# Patient Record
Sex: FEMALE | Race: WHITE | NOT HISPANIC OR LATINO | Employment: PART TIME | URBAN - METROPOLITAN AREA
[De-identification: names, ages, dates, MRNs, and addresses within clinical notes are randomized per-mention and may not be internally consistent; named-entity substitution may affect disease eponyms.]

---

## 2017-01-06 ENCOUNTER — TELEPHONE (OUTPATIENT)
Dept: OBSTETRICS AND GYNECOLOGY | Facility: CLINIC | Age: 22
End: 2017-01-06

## 2017-01-06 ENCOUNTER — HOSPITAL ENCOUNTER (INPATIENT)
Facility: OTHER | Age: 22
LOS: 4 days | Discharge: HOME | End: 2017-01-10
Attending: OBSTETRICS & GYNECOLOGY | Admitting: OBSTETRICS & GYNECOLOGY
Payer: COMMERCIAL

## 2017-01-06 DIAGNOSIS — O42.92 FULL-TERM PREMATURE RUPTURE OF MEMBRANES, UNSPECIFIED DURATION TO ONSET OF LABOR: Primary | ICD-10-CM

## 2017-01-06 DIAGNOSIS — O42.90 PROM (PREMATURE RUPTURE OF MEMBRANES): ICD-10-CM

## 2017-01-06 LAB
ABO + RH BLD: NORMAL
BASOPHILS # BLD AUTO: 0.01 K/UL
BASOPHILS NFR BLD: 0.1 %
BLD GP AB SCN CELLS X3 SERPL QL: NORMAL
DIFFERENTIAL METHOD: ABNORMAL
EOSINOPHIL # BLD AUTO: 0.1 K/UL
EOSINOPHIL NFR BLD: 0.7 %
ERYTHROCYTE [DISTWIDTH] IN BLOOD BY AUTOMATED COUNT: 13.2 %
HCT VFR BLD AUTO: 38.9 %
HGB BLD-MCNC: 13.1 G/DL
LYMPHOCYTES # BLD AUTO: 1.8 K/UL
LYMPHOCYTES NFR BLD: 17.2 %
MCH RBC QN AUTO: 30 PG
MCHC RBC AUTO-ENTMCNC: 33.7 %
MCV RBC AUTO: 89 FL
MONOCYTES # BLD AUTO: 0.9 K/UL
MONOCYTES NFR BLD: 8.2 %
NEUTROPHILS # BLD AUTO: 7.7 K/UL
NEUTROPHILS NFR BLD: 73.4 %
PLATELET # BLD AUTO: 214 K/UL
PMV BLD AUTO: 11.3 FL
RBC # BLD AUTO: 4.37 M/UL
WBC # BLD AUTO: 10.48 K/UL

## 2017-01-06 PROCEDURE — 63600175 PHARM REV CODE 636 W HCPCS: Performed by: ADVANCED PRACTICE MIDWIFE

## 2017-01-06 PROCEDURE — 3E0P7GC INTRODUCTION OF OTHER THERAPEUTIC SUBSTANCE INTO FEMALE REPRODUCTIVE, VIA NATURAL OR ARTIFICIAL OPENING: ICD-10-PCS | Performed by: OBSTETRICS & GYNECOLOGY

## 2017-01-06 PROCEDURE — 36415 COLL VENOUS BLD VENIPUNCTURE: CPT

## 2017-01-06 PROCEDURE — 11000001 HC ACUTE MED/SURG PRIVATE ROOM

## 2017-01-06 PROCEDURE — 99284 EMERGENCY DEPT VISIT MOD MDM: CPT

## 2017-01-06 PROCEDURE — 86900 BLOOD TYPING SEROLOGIC ABO: CPT

## 2017-01-06 PROCEDURE — 25000003 PHARM REV CODE 250: Performed by: ADVANCED PRACTICE MIDWIFE

## 2017-01-06 PROCEDURE — 86850 RBC ANTIBODY SCREEN: CPT

## 2017-01-06 PROCEDURE — 85025 COMPLETE CBC W/AUTO DIFF WBC: CPT

## 2017-01-06 RX ORDER — ONDANSETRON 8 MG/1
8 TABLET, ORALLY DISINTEGRATING ORAL EVERY 8 HOURS PRN
Status: DISCONTINUED | OUTPATIENT
Start: 2017-01-06 | End: 2017-01-08

## 2017-01-06 RX ORDER — ZOLPIDEM TARTRATE 5 MG/1
5 TABLET ORAL NIGHTLY
Status: DISCONTINUED | OUTPATIENT
Start: 2017-01-06 | End: 2017-01-10 | Stop reason: HOSPADM

## 2017-01-06 RX ORDER — MISOPROSTOL 200 UG/1
600 TABLET ORAL
Status: DISCONTINUED | OUTPATIENT
Start: 2017-01-06 | End: 2017-01-08

## 2017-01-06 RX ORDER — OXYTOCIN/RINGER'S LACTATE 20/1000 ML
2 PLASTIC BAG, INJECTION (ML) INTRAVENOUS CONTINUOUS
Status: DISCONTINUED | OUTPATIENT
Start: 2017-01-06 | End: 2017-01-08

## 2017-01-06 RX ORDER — SODIUM CHLORIDE, SODIUM LACTATE, POTASSIUM CHLORIDE, CALCIUM CHLORIDE 600; 310; 30; 20 MG/100ML; MG/100ML; MG/100ML; MG/100ML
INJECTION, SOLUTION INTRAVENOUS CONTINUOUS
Status: DISCONTINUED | OUTPATIENT
Start: 2017-01-06 | End: 2017-01-08

## 2017-01-06 RX ORDER — DIPHENHYDRAMINE HCL 25 MG
50 CAPSULE ORAL ONCE
Status: DISCONTINUED | OUTPATIENT
Start: 2017-01-06 | End: 2017-01-10 | Stop reason: HOSPADM

## 2017-01-06 RX ADMIN — SODIUM CHLORIDE, SODIUM LACTATE, POTASSIUM CHLORIDE, AND CALCIUM CHLORIDE: .6; .31; .03; .02 INJECTION, SOLUTION INTRAVENOUS at 09:01

## 2017-01-06 RX ADMIN — DINOPROSTONE 10 MG: 10 INSERT VAGINAL at 10:01

## 2017-01-06 RX ADMIN — ZOLPIDEM TARTRATE 5 MG: 5 TABLET, FILM COATED ORAL at 10:01

## 2017-01-06 RX ADMIN — DEXTROSE 5 MILLION UNITS: 50 INJECTION, SOLUTION INTRAVENOUS at 09:01

## 2017-01-06 NOTE — IP AVS SNAPSHOT
[facility] Location ([facility])  [address] [place], [place] [id number]  Phone: [phone]           Patient Discharge Instructions     Our goal is to set you up for success. This packet includes information on your condition, medications, and your home care. It will help you to care for yourself so you don't get sicker and need to go back to the hospital.     Please ask your nurse if you have any questions.        There are many details to remember when preparing to leave the hospital. Here is what you will need to do:    1. Take your medicine. If you are prescribed medications, review your Medication List in the following pages. You may have new medications to  at the pharmacy and others that you'll need to stop taking. Review the instructions for how and when to take your medications. Talk with your doctor or nurses if you are unsure of what to do.     2. Go to your follow-up appointments. Specific follow-up information is listed in the following pages. Your may be contacted by a transition nurse or clinical provider about future appointments. Be sure we have all of the phone numbers to reach you, if needed. Please contact your provider's office if you are unable to make an appointment.     3. Watch for warning signs. Your doctor or nurse will give you detailed warning signs to watch for and when to call for assistance. These instructions may also include educational information about your condition. If you experience any of warning signs to your health, call your doctor.               [facility]  Unless otherwise directed by your provider, please contact [facility], our nurse care line that is available for 24/7 assistance.     [phone] (toll-free)    Registered nurses in the [facility] provide clinical advisement, health education, appointment booking, and other advisory services.                    ** Verify the list of medication(s) below is accurate and up to  date. Carry this with you in case of emergency. If your medications have changed, please notify your healthcare provider.             Medication List      START taking these medications        Additional Info                      ibuprofen 600 MG tablet   Commonly known as:  ADVIL,MOTRIN   Quantity:  30 tablet   Refills:  2   Dose:  600 mg    Last time this was given:  600 mg on 1/10/2017  9:01 AM   Instructions:  Take 1 tablet (600 mg total) by mouth every 6 (six) hours.     Begin Date    AM    Noon    PM    Bedtime       oxycodone-acetaminophen 5-325 mg per tablet   Commonly known as:  PERCOCET   Quantity:  20 tablet   Refills:  0   Dose:  1 tablet    Last time this was given:  1 tablet on 1/9/2017 10:53 PM   Instructions:  Take 1 tablet by mouth every 4 (four) hours as needed.     Begin Date    AM    Noon    PM    Bedtime         CONTINUE taking these medications        Additional Info                      PRENATAL COMPLETE ORAL   Refills:  0    Instructions:  Take by mouth.     Begin Date    AM    Noon    PM    Bedtime       terconazole 0.4 % Crea   Commonly known as:  TERAZOL 7   Quantity:  45 g   Refills:  1   Dose:  1 applicator    Instructions:  Place 1 applicator vaginally once daily.     Begin Date    AM    Noon    PM    Bedtime            Where to Get Your Medications      These medications were sent to [facility]  [place], [place] - [address] [place] [address]     Phone:  [phone]     ibuprofen 600 MG tablet         You can get these medications from any pharmacy     Bring a paper prescription for each of these medications     oxycodone-acetaminophen 5-325 mg per tablet                  Please bring to all follow up appointments:    1. A copy of your discharge instructions.  2. All medicines you are currently taking in their original bottles.  3. Identification and insurance card.    Please arrive 15 minutes ahead of scheduled appointment time.    Please call  24 hours in advance if you must reschedule your appointment and/or time.        Follow-up Information     Follow up with Midwives In 6 weeks.    Why:  Post-partum check-up        Discharge Instructions     Future Orders    Activity as tolerated     Call MD for:  difficulty breathing or increased cough     Call MD for:  increased confusion or weakness     Call MD for:  persistent dizziness, light-headedness, or visual disturbances     Call MD for:  persistent nausea and vomiting or diarrhea     Call MD for:  severe persistent headache     Call MD for:  severe uncontrolled pain     Call MD for:  temperature >100.4     Call MD for:  worsening rash     Diet general     Questions:    Total calories:      Fat restriction, if any:      Protein restriction, if any:      Na restriction, if any:      Fluid restriction:      Additional restrictions:          Discharge Instructions       Breastfeeding Discharge Instructions       Feed the baby at the earliest sign of hunger or comfort  o Hands to mouth, sucking motions  o Rooting or searching for something to suck on  o Dont wait for crying - it is a late sign of hunger and comfort.     The feedings may be 8-12 times per 24hrs and will not follow a schedule   Avoid pacifiers and bottles for the first 4 weeks   Alternate the breast you start the feeding with, or start with the breast that feels the fullest   Switch breasts when the baby takes himself off the breast or falls asleep   Keep offering breasts until the baby looks full, no longer gives hunger signs, and stays asleep when placed on his back in the crib   If the baby is sleepy and wont wake for a feeding, put the baby skin-to-skin dressed in a diaper against the mothers bare chest   Sleep near your baby   The baby should be positioned and latched on to the breast correctly  o Chest-to-chest, chin in the breast  o Babys lips are flipped outward  o Babys mouth is stretched open wide like a  shout  o Babys sucking should feel like tugging to the mother  - The baby should be drinking at the breast:  o You should hear swallowing or gulping throughout the feeding  o You should see milk on the babys lips when he comes off the breast  o Your breasts should be softer when the baby is finished feeding  o The baby should look relaxed at the end of feedings  o After the 4th day and your milk is in:  o The babys poop should turn bright yellow and be loose, watery, and seedy  o The baby should have at least 3-4 poops the size of the palm of your hand per day  o The baby should have at least 6-8 wet diapers per day  o The urine should be light yellow in color  You should drink when you are thirsty and eat a healthy diet when you are    hungry.     Take naps to get the rest you need.   Take medications and/or drink alcohol only with permission of your obstetrician    or the babys pediatrician.  You can also call the [facility]ant Risk Center,   ([phone]), Monday-Friday, 8am-5pm Central time, to get the most   up-to-date evidence-based information on the use of medications during   pregnancy and breastfeeding.      The baby should be examined by a pediatrician at 3-5 days of age.  Once your   milk comes in, the baby should be gaining at least ½ - 1oz each day and should be back to birthweight no later than 10-14 days of age.          Community Resources    [facility] Breastfeeding Warmline:  [phone]  Local [facility] clinics: provide incentives and breastpumps to eligible mothers  [employer] (LLLI):  mother-to-mother support group website        www.lll.org  Local La [employer]eche League mother-to-mother support groups:        www.llalonsoPayActivfferson.com        La [employer]eche Levazquez of [place]         www.aranza@Metaboli.com  Dr. Fernando Somers website for latch videos and general information:        www.breastfeedinginc.ca  [facility] Risk [facility] is a call center that provides information about the  "safety of taking medications while breastfeeding.  Call [phone], M-F, 8am-5pm, CT.  International Lactation Consultant Association provides resources for assistance:        www.ilca.org  [place] [employer]feeding [employer] provides informationand resources for parents  and the community    http://louisianabreastfeeding.org     Bety mom provides resources for assistance:        www.nolamom.org  Part[employer] for Healthy Babies:  9-463-621-BABY(9896)  [employer] provides a list of breastfeeding services by zip code:        www.Affinity.Zeuss  Cafe au Lait:  220.738.8601 a breastfeeding support group for women of color        Primary Diagnosis     Your primary diagnosis was:  Status Post       Admission Information     Date & Time Provider Department CSN    2017  7:48 PM Abisai Dunbar Jr., MD [facility] [id number]      Care Providers     Provider Role Specialty Primary office phone    Abisai Dunbar Jr., MD Attending Provider Obstetrics [phone]    Maribeth Gutierrez MD Consulting Physician  Obstetrics and Gynecology [phone]    Shaye Tracy MD Surgeon  Obstetrics [phone]      Your Vitals Were     BP Pulse Temp Resp Height Weight    132/73 (Patient Position: Lying, BP Method: Automatic) 98 98.1 °F (36.7 °C) (Oral) 18 5' 4" (1.626 m) 85.3 kg (188 lb)    Last Period SpO2 BMI          2016 98% 32.27 kg/m2        Recent Lab Values     No lab values to display.      Allergies as of 1/10/2017     No Known Allergies      Advance Directives     An advance directive is a document which, in the event you are no longer able to make decisions for yourself, tells your healthcare team what kind of treatment you do or do not want to receive, or who you would like to make those decisions for you.  If you do not currently have an advance directive, [facility] encourages you to create one.  For more information call:  [phone]), [phone] ([phone]),  or log " on to [email or web address].        Language Assistance Services     ATTENTION: Language assistance services are available, free of charge. Please call [phone].      ATENCIÓN: Si habla mikey, tiene a castelan disposición servicios gratuitos de asistencia lingüística. Llame al [phone].     CHÚ Ý: N?u b?n nói Ti?ng Vi?t, có các d?ch v? h? tr? ngôn ng? mi?n phí dành cho b?n. G?i s? [phone].         [facility] complies with applicable Federal civil rights laws and does not discriminate on the basis of race, color, national origin, age, disability, or sex.

## 2017-01-06 NOTE — IP AVS SNAPSHOT
[facility] Location ([facility]) [address] [facility] [address]  Phone: [phone]           I have received a copy of my After Visit Summary and discharge instructions from [facility].    INSTRUCTIONS RECEIVED AND UNDERSTOOD BY:                     Patient/Patient Representative: ________________________________________________________________     Date/Time: ________________________________________________________________                     Instructions Given By: ________________________________________________________________     Date/Time: ________________________________________________________________

## 2017-01-06 NOTE — TELEPHONE ENCOUNTER
----- Message from Matty Givens sent at 1/6/2017  8:34 AM CST -----  Contact: Pt  Pt states that her water has broke. Please call and advise. Also I sent you a text with her information @08:32

## 2017-01-07 ENCOUNTER — ANESTHESIA (OUTPATIENT)
Dept: OBSTETRICS AND GYNECOLOGY | Facility: OTHER | Age: 22
End: 2017-01-07
Payer: COMMERCIAL

## 2017-01-07 ENCOUNTER — ANESTHESIA EVENT (OUTPATIENT)
Dept: OBSTETRICS AND GYNECOLOGY | Facility: OTHER | Age: 22
End: 2017-01-07
Payer: COMMERCIAL

## 2017-01-07 PROCEDURE — 27800517 HC TRAY,EPIDURAL-CONTINUOUS: Performed by: ANESTHESIOLOGY

## 2017-01-07 PROCEDURE — 63600175 PHARM REV CODE 636 W HCPCS: Performed by: ADVANCED PRACTICE MIDWIFE

## 2017-01-07 PROCEDURE — 25000003 PHARM REV CODE 250: Performed by: ADVANCED PRACTICE MIDWIFE

## 2017-01-07 PROCEDURE — 59510 CESAREAN DELIVERY: CPT | Mod: GB,,, | Performed by: OBSTETRICS & GYNECOLOGY

## 2017-01-07 PROCEDURE — 63600175 PHARM REV CODE 636 W HCPCS

## 2017-01-07 PROCEDURE — 3E033VJ INTRODUCTION OF OTHER HORMONE INTO PERIPHERAL VEIN, PERCUTANEOUS APPROACH: ICD-10-PCS | Performed by: OBSTETRICS & GYNECOLOGY

## 2017-01-07 PROCEDURE — 63600175 PHARM REV CODE 636 W HCPCS: Performed by: OBSTETRICS & GYNECOLOGY

## 2017-01-07 PROCEDURE — 11000001 HC ACUTE MED/SURG PRIVATE ROOM

## 2017-01-07 PROCEDURE — 25000003 PHARM REV CODE 250: Performed by: ANESTHESIOLOGY

## 2017-01-07 PROCEDURE — 37000008 HC ANESTHESIA 1ST 15 MINUTES: Performed by: OBSTETRICS & GYNECOLOGY

## 2017-01-07 PROCEDURE — 37000009 HC ANESTHESIA EA ADD 15 MINS: Performed by: OBSTETRICS & GYNECOLOGY

## 2017-01-07 PROCEDURE — 27200710 HC EPIDURAL INFUSION PUMP SET: Performed by: ANESTHESIOLOGY

## 2017-01-07 PROCEDURE — 51702 INSERT TEMP BLADDER CATH: CPT

## 2017-01-07 PROCEDURE — 59514 CESAREAN DELIVERY ONLY: CPT | Mod: ,,, | Performed by: ANESTHESIOLOGY

## 2017-01-07 PROCEDURE — 62326 NJX INTERLAMINAR LMBR/SAC: CPT | Performed by: ANESTHESIOLOGY

## 2017-01-07 PROCEDURE — 72100002 HC LABOR CARE, 1ST 8 HOURS

## 2017-01-07 PROCEDURE — 63600175 PHARM REV CODE 636 W HCPCS: Performed by: ANESTHESIOLOGY

## 2017-01-07 RX ORDER — FENTANYL/BUPIVACAINE/NS/PF 2MCG/ML-.1
PLASTIC BAG, INJECTION (ML) INJECTION CONTINUOUS
Status: DISCONTINUED | OUTPATIENT
Start: 2017-01-07 | End: 2017-01-08

## 2017-01-07 RX ORDER — FENTANYL CITRATE 50 UG/ML
INJECTION, SOLUTION INTRAMUSCULAR; INTRAVENOUS
Status: COMPLETED
Start: 2017-01-07 | End: 2017-01-07

## 2017-01-07 RX ORDER — CARBOPROST TROMETHAMINE 250 UG/ML
INJECTION, SOLUTION INTRAMUSCULAR
Status: DISCONTINUED
Start: 2017-01-07 | End: 2017-01-08 | Stop reason: WASHOUT

## 2017-01-07 RX ORDER — FENTANYL/BUPIVACAINE/NS/PF 2MCG/ML-.1
PLASTIC BAG, INJECTION (ML) INJECTION CONTINUOUS PRN
Status: DISCONTINUED | OUTPATIENT
Start: 2017-01-07 | End: 2017-01-08

## 2017-01-07 RX ORDER — SODIUM CITRATE AND CITRIC ACID MONOHYDRATE 334; 500 MG/5ML; MG/5ML
30 SOLUTION ORAL ONCE
Status: COMPLETED | OUTPATIENT
Start: 2017-01-07 | End: 2017-01-07

## 2017-01-07 RX ORDER — ONDANSETRON 2 MG/ML
4 INJECTION INTRAMUSCULAR; INTRAVENOUS EVERY 6 HOURS PRN
Status: DISCONTINUED | OUTPATIENT
Start: 2017-01-07 | End: 2017-01-08

## 2017-01-07 RX ORDER — METOCLOPRAMIDE HYDROCHLORIDE 5 MG/ML
10 INJECTION INTRAMUSCULAR; INTRAVENOUS ONCE
Status: COMPLETED | OUTPATIENT
Start: 2017-01-07 | End: 2017-01-07

## 2017-01-07 RX ORDER — FENTANYL/BUPIVACAINE/NS/PF 2MCG/ML-.1
PLASTIC BAG, INJECTION (ML) INJECTION
Status: DISPENSED
Start: 2017-01-07 | End: 2017-01-07

## 2017-01-07 RX ORDER — CEFAZOLIN SODIUM 2 G/50ML
2 SOLUTION INTRAVENOUS ONCE
Status: COMPLETED | OUTPATIENT
Start: 2017-01-07 | End: 2017-01-07

## 2017-01-07 RX ORDER — MISOPROSTOL 200 UG/1
TABLET ORAL
Status: COMPLETED
Start: 2017-01-07 | End: 2017-01-09

## 2017-01-07 RX ORDER — FAMOTIDINE 10 MG/ML
20 INJECTION INTRAVENOUS ONCE
Status: COMPLETED | OUTPATIENT
Start: 2017-01-07 | End: 2017-01-07

## 2017-01-07 RX ORDER — LIDOCAINE HYDROCHLORIDE AND EPINEPHRINE 15; 5 MG/ML; UG/ML
INJECTION, SOLUTION EPIDURAL
Status: DISCONTINUED | OUTPATIENT
Start: 2017-01-07 | End: 2017-01-08

## 2017-01-07 RX ORDER — BUPIVACAINE HYDROCHLORIDE 2.5 MG/ML
INJECTION, SOLUTION EPIDURAL; INFILTRATION; INTRACAUDAL
Status: DISPENSED
Start: 2017-01-07 | End: 2017-01-08

## 2017-01-07 RX ORDER — METHYLERGONOVINE MALEATE 0.2 MG/ML
INJECTION INTRAVENOUS
Status: COMPLETED
Start: 2017-01-07 | End: 2017-01-08

## 2017-01-07 RX ORDER — BUPIVACAINE HYDROCHLORIDE 2.5 MG/ML
INJECTION, SOLUTION EPIDURAL; INFILTRATION; INTRACAUDAL
Status: DISPENSED
Start: 2017-01-07 | End: 2017-01-07

## 2017-01-07 RX ADMIN — ONDANSETRON 8 MG: 8 TABLET, ORALLY DISINTEGRATING ORAL at 07:01

## 2017-01-07 RX ADMIN — DEXTROSE 2.5 MILLION UNITS: 50 INJECTION, SOLUTION INTRAVENOUS at 02:01

## 2017-01-07 RX ADMIN — DEXTROSE 2.5 MILLION UNITS: 50 INJECTION, SOLUTION INTRAVENOUS at 01:01

## 2017-01-07 RX ADMIN — PHENYLEPHRINE HYDROCHLORIDE 200 MCG: 10 INJECTION INTRAVENOUS at 11:01

## 2017-01-07 RX ADMIN — PROMETHAZINE HYDROCHLORIDE 12.5 MG: 25 INJECTION INTRAMUSCULAR; INTRAVENOUS at 05:01

## 2017-01-07 RX ADMIN — FENTANYL CITRATE 100 MCG: 50 INJECTION, SOLUTION INTRAMUSCULAR; INTRAVENOUS at 11:01

## 2017-01-07 RX ADMIN — DEXTROSE 2.5 MILLION UNITS: 50 INJECTION, SOLUTION INTRAVENOUS at 10:01

## 2017-01-07 RX ADMIN — OXYTOCIN 6 UNITS: 10 INJECTION, SOLUTION INTRAMUSCULAR; INTRAVENOUS at 11:01

## 2017-01-07 RX ADMIN — PHENYLEPHRINE HYDROCHLORIDE 100 MCG: 10 INJECTION INTRAVENOUS at 11:01

## 2017-01-07 RX ADMIN — METOCLOPRAMIDE 10 MG: 5 INJECTION, SOLUTION INTRAMUSCULAR; INTRAVENOUS at 11:01

## 2017-01-07 RX ADMIN — DEXTROSE 2.5 MILLION UNITS: 50 INJECTION, SOLUTION INTRAVENOUS at 06:01

## 2017-01-07 RX ADMIN — Medication 5 ML: at 11:01

## 2017-01-07 RX ADMIN — ONDANSETRON HYDROCHLORIDE 4 MG: 2 SOLUTION INTRAMUSCULAR; INTRAVENOUS at 03:01

## 2017-01-07 RX ADMIN — LIDOCAINE HYDROCHLORIDE AND EPINEPHRINE 3 ML: 15; 5 INJECTION, SOLUTION EPIDURAL at 11:01

## 2017-01-07 RX ADMIN — CEFAZOLIN SODIUM 2 G: 2 SOLUTION INTRAVENOUS at 11:01

## 2017-01-07 RX ADMIN — FAMOTIDINE 20 MG: 10 INJECTION, SOLUTION INTRAVENOUS at 11:01

## 2017-01-07 RX ADMIN — LIDOCAINE HYDROCHLORIDE,EPINEPHRINE BITARTRATE 10 ML: 20; .005 INJECTION, SOLUTION EPIDURAL; INFILTRATION; INTRACAUDAL; PERINEURAL at 11:01

## 2017-01-07 RX ADMIN — Medication 10 ML/HR: at 11:01

## 2017-01-07 RX ADMIN — DEXTROSE 2.5 MILLION UNITS: 50 INJECTION, SOLUTION INTRAVENOUS at 05:01

## 2017-01-07 RX ADMIN — ONDANSETRON HYDROCHLORIDE 4 MG: 2 SOLUTION INTRAMUSCULAR; INTRAVENOUS at 11:01

## 2017-01-07 RX ADMIN — SODIUM CITRATE AND CITRIC ACID MONOHYDRATE 30 ML: 500; 334 SOLUTION ORAL at 11:01

## 2017-01-07 RX ADMIN — Medication 2 MILLI-UNITS/MIN: at 11:01

## 2017-01-07 NOTE — PROGRESS NOTES
C/O nausea  Zofran IV given  's Ioana 1  CTX. Q 3-4/50-60  VE 4/90/-2/clfl  Continue induction

## 2017-01-07 NOTE — PROGRESS NOTES
Very uncomfortable with ctx.  Sitting on ball at bedside with doulas and  at side  VSS   's mod annette   CTX Q 1-2/50/mod to palp  VE unable to check r/t pt discomfort  Cervidil removed without difficulty  Continue induction   Start pitocin per protocol after pt rest

## 2017-01-07 NOTE — PROGRESS NOTES
Doing well  C/O occas cramping  VSS  's Ioana 1  CtX irreg/mild to palp  VE 2/40/-4/cl fl  Cervidil inserted without difficulty into posterior fornix  ambien or mu prn HS  Reassess prn

## 2017-01-07 NOTE — H&P
[facility]  History & Physical  Obstetrics      SUBJECTIVE:     Chief Complaint/Reason for Admission: PROM      History of Present Illness:  Maria Del Rosario Vasquez is a 21 y.o.  female with an Estimated Date of Delivery: 16 admitted for labor management.  Her current obstetrical history is significant for GBS colonizer.  She reports contractions since #:30  very irregular and very mild. Fetal Movement: normal.    PTA Medications   Medication Sig    PNV CMB#21/IRON/FOLIC ACID (PRENATAL COMPLETE ORAL) Take by mouth.    terconazole (TERAZOL 7) 0.4 % Crea Place 1 applicator vaginally once daily.       Review of patient's allergies indicates:  No Known Allergies     Past Medical History   Diagnosis Date    Anemia      past     Past Surgical History   Procedure Laterality Date    Tonsillectomy      [place] tooth extraction       Family History   Problem Relation Age of Onset    Diabetes Paternal Grandfather     Hyperlipidemia Paternal Grandfather     Heart disease Paternal Grandfather     Hypertension Paternal Grandfather     Emphysema Paternal Grandmother     Glaucoma Maternal Grandmother     Heart block Maternal Grandfather     No Known Problems Father     No Known Problems Mother     Colon cancer Neg Hx     Breast cancer Neg Hx     Ovarian cancer Neg Hx      Social History   Substance Use Topics    Smoking status: Never Smoker    Smokeless tobacco: Never Used    Alcohol use No      Comment: not with pregnancy       Review of Systems  Constitutional: no fever or chills     OBJECTIVE:     Vital Signs (Most Recent):  Temp: 98.1 °F (36.7 °C) (17)  Pulse: 101 (17)  BP: 131/78 (17)  SpO2: 96 % (17)    Physical Exam:  General:  alert and normal appearing gravid female   Skin:  Skin color, texture, turgor normal. No rashes or lesions   HEENT:  conjunctivae/corneas clear. PERRL.   Lungs:  clear to auscultation bilaterally   Heart:  regular  rate and rhythm, S1, S2 normal, no murmur, click, rub or gallop   Breasts:  no discharge, erythema, or tenderness   Abdomen:  normal findings: gravid   Uterine Size:  size equals dates   Presentations:  cephalic   FHT:  150's BPM   Pelvis: Vulva and vagina appear normal. Bimanual exam reveals normal uterus and adnexa.   Cervix:     Dilation: 2cm    Effacement: 40 %    Station:  Floating    Consistency: medium    Position: posterior     Laboratory:  Lab Results   Component Value Date    GROUPTRH O POS 2016    HEPBSAG Negative 2016        Diagnostic Results:  Labs: Reviewed  GBS POS    ASSESSMENT/PLAN:     40w6d gestation.  PROM   Conditions: GBBS Colonization     Risks, benefits, alternatives and possible complications have been discussed in detail with the patient.  Pre-admission, admission, and post admission procedures and expectations were discussed in detail.  All questions answered, all appropriate consents will be signed at the Hospital. Admission is planned for today.   Expectant management then induction if no progress.

## 2017-01-07 NOTE — ED TRIAGE NOTES
Placed in RM2. Mildred Villaseñor [facility] notified of arrival. Resting comfortably.

## 2017-01-07 NOTE — PROGRESS NOTES
Comfortable with epidural  VSS pitocin @ 2mu  's Ioana 1  Ctx. Q 3-5/40-50  VE no change 3/50/-3/cl fl/  Cook catheter placed without difficulty   Will continue pitocin induction

## 2017-01-07 NOTE — ANESTHESIA PREPROCEDURE EVALUATION
2017  Maria Del Rosario Vasquez is a 21 y.o., female. female with an Estimated Date of Delivery: 16 admitted for labor management. Her current obstetrical history is significant for GBS colonizer.     OB History      Para Term  AB TAB SAB Ectopic Multiple Living    2 0 0 0 1 0 1 0 0 0            Patient Active Problem List   Diagnosis    Prenatal care in third trimester    Pap smear, low-risk    GBS (group B Streptococcus carrier), +RV culture, currently pregnant    Elevated glucose    PROM (premature rupture of membranes)       Review of patient's allergies indicates:  No Known Allergies     No current facility-administered medications on file prior to encounter.      Current Outpatient Prescriptions on File Prior to Encounter   Medication Sig Dispense Refill    PNV CMB#21/IRON/FOLIC ACID (PRENATAL COMPLETE ORAL) Take by mouth.      terconazole (TERAZOL 7) 0.4 % Crea Place 1 applicator vaginally once daily. 45 g 1       Past Surgical History   Procedure Laterality Date    Tonsillectomy      [place] tooth extraction         Social History     Social History    Marital status:      Spouse name: N/A    Number of children: N/A    Years of education: N/A     Occupational History    Not on file.     Social History Main Topics    Smoking status: Never Smoker    Smokeless tobacco: Never Used    Alcohol use No      Comment: not with pregnancy    Drug use: No    Sexual activity: Yes     Partners: Male     Birth control/ protection: OCP     Other Topics Concern    Not on file     Social History Narrative     Derek    First baby for both         Vital Signs Range (Last 24H):  Temp:  [36.5 °C (97.7 °F)-37.4 °C (99.4 °F)]   Pulse:  []   Resp:  [18]   BP: (104-137)/(62-94)   SpO2:  [95 %-99 %]       CBC:   Recent Labs      17   2117   WBC  10.48   RBC  4.37    HGB  13.1   HCT  38.9   PLT  214   MCV  89   MCH  30.0   MCHC  33.7       CMP: No results for input(s): NA, K, CL, CO2, BUN, CREATININE, GLU, MG, PHOS, CALCIUM, ALBUMIN, PROT, ALKPHOS, ALT, AST, BILITOT in the last 72 hours.    INR  No results for input(s): INR, PROTIME, APTT in the last 72 hours.    Invalid input(s): PT            [facility] Anesthesia Evaluation    I have reviewed the Patient Summary Reports.    I have reviewed the Nursing Notes.   I have reviewed the Medications.     Review of Systems  Anesthesia Hx:  No problems with previous Anesthesia  History of prior surgery of interest to airway management or planning: Denies Family Hx of Anesthesia complications.   Denies Personal Hx of Anesthesia complications.   Social:  No Alcohol Use, Non-Smoker    Hematology/Oncology:  Hematology Normal   Oncology Normal     EENT/Dental:EENT/Dental Normal   Cardiovascular:  Cardiovascular Normal Exercise tolerance: good     Pulmonary:  Pulmonary Normal    Renal/:  Renal/ Normal     Hepatic/GI:  Hepatic/GI Normal    Musculoskeletal:  Musculoskeletal Normal    Neurological:  Neurology Normal    Endocrine:  Endocrine Normal    Dermatological:  Skin Normal    Psych:   anxiety          Physical Exam  General:  Well nourished    Airway/Jaw/Neck:  Airway Findings: Mouth Opening: Normal Tongue: Normal  General Airway Assessment: Adult  Mallampati: III  TM Distance: Normal, at least 6 cm  Jaw/Neck Findings:  Neck ROM: Normal ROM      Dental:  Dental Findings: In tact   Chest/Lungs:  Chest/Lungs Findings: Normal Respiratory Rate, Clear to auscultation     Heart/Vascular:  Heart Findings: Rate: Normal  Rhythm: Regular Rhythm  Sounds: Normal        Mental Status:  Mental Status Findings:  Cooperative, Alert and Oriented         Anesthesia Plan  Type of Anesthesia, risks & benefits discussed:  Anesthesia Type:  general, epidural, CSE, spinal  Patient's Preference:   Intra-op Monitoring Plan:   Intra-op Monitoring Plan Comments:    Post Op Pain Control Plan:   Post Op Pain Control Plan Comments:   Induction:   IV  Beta Blocker:  Patient is not currently on a Beta-Blocker (No further documentation required).       Informed Consent: Patient understands risks and agrees with Anesthesia plan.  Questions answered. Anesthesia consent signed with patient.  ASA Score: 2     Day of Surgery Review of History & Physical:  There are no significant changes.          Ready For Surgery From Anesthesia Perspective.

## 2017-01-07 NOTE — ANESTHESIA PROCEDURE NOTES
Epidural    Patient location during procedure: OB   Reason for block: primary anesthetic   Diagnosis: IUP   Start time: 1/7/2017 11:38 AM  Timeout: 1/7/2017 11:35 AM  End time: 1/7/2017 11:42 AM  Surgery related to: labor  Staffing  Anesthesiologist: KATELYN ALONSO  Resident/CRNA: TONY BOBO  Performed by: resident/CRNA   Preanesthetic Checklist  Completed: patient identified, site marked, surgical consent, pre-op evaluation, timeout performed, IV checked, risks and benefits discussed, monitors and equipment checked, anesthesia consent given, hand hygiene performed and patient being monitored  Preparation  Patient position: sitting  Prep: ChloraPrep  Patient monitoring: Pulse Ox and Blood Pressure  Epidural  Skin Anesthetic: lidocaine 1%  Skin Wheal: 3 mL  Administration type: continuous  Approach: midline  Interspace: L3-4  Injection technique: LISA saline  Needle and Epidural Catheter  Needle type: Tuohy   Needle gauge: 17  Needle length: 3.5 inches  Needle insertion depth: 6 cm  Catheter type: springwound  Catheter size: 19 G  Catheter at skin depth: 11 cm  Test dose: 3 mL of lidocaine 1.5% with Epi 1-to-200,000  Additional Documentation: incremental injection, no signs/symptoms of IV or SA injection, no paresthesia on injection, no significant pain on injection and no significant complaints from patient  Needle localization: anatomical landmarks  Medications:  Bolus administered: 10 mL of 0.125% bupivacaine  Opioid administered: 100 mcg of   fentanyl  Volume per aspiration: 5 mL  Time between aspirations: 5 minutes  Assessment  Ease of block: easy  Patient's tolerance of the procedure: comfortable throughout block and no complaints

## 2017-01-07 NOTE — PROGRESS NOTES
Called to pts room  Would like to have epidural inserted prior to pitocin induction  Anesthesia aware of request.

## 2017-01-08 PROBLEM — O42.92 FULL-TERM PREMATURE RUPTURE OF MEMBRANES: Status: ACTIVE | Noted: 2017-01-08

## 2017-01-08 LAB
ALLENS TEST: ABNORMAL
BASOPHILS # BLD AUTO: ABNORMAL K/UL
BASOPHILS NFR BLD: 0 %
DIFFERENTIAL METHOD: ABNORMAL
EOSINOPHIL # BLD AUTO: ABNORMAL K/UL
EOSINOPHIL NFR BLD: 0 %
ERYTHROCYTE [DISTWIDTH] IN BLOOD BY AUTOMATED COUNT: 13.4 %
HCO3 UR-SCNC: 21.1 MMOL/L (ref 24–28)
HCT VFR BLD AUTO: 33.1 %
HGB BLD-MCNC: 11 G/DL
LYMPHOCYTES # BLD AUTO: ABNORMAL K/UL
LYMPHOCYTES NFR BLD: 5 %
MCH RBC QN AUTO: 30.1 PG
MCHC RBC AUTO-ENTMCNC: 33.2 %
MCV RBC AUTO: 91 FL
MONOCYTES # BLD AUTO: ABNORMAL K/UL
MONOCYTES NFR BLD: 14 %
NEUTROPHILS NFR BLD: 69 %
NEUTS BAND NFR BLD MANUAL: 12 %
PCO2 BLDA: 49.3 MMHG (ref 35–45)
PH SMN: 7.24 [PH] (ref 7.35–7.45)
PLATELET # BLD AUTO: 185 K/UL
PLATELET BLD QL SMEAR: ABNORMAL
PMV BLD AUTO: 11.1 FL
PO2 BLDA: 22 MMHG (ref 80–100)
POC BE: -6 MMOL/L
POC SATURATED O2: 29 % (ref 95–100)
RBC # BLD AUTO: 3.65 M/UL
SAMPLE: ABNORMAL
SITE: ABNORMAL
WBC # BLD AUTO: 22.49 K/UL

## 2017-01-08 PROCEDURE — 36415 COLL VENOUS BLD VENIPUNCTURE: CPT

## 2017-01-08 PROCEDURE — 63600175 PHARM REV CODE 636 W HCPCS: Performed by: ANESTHESIOLOGY

## 2017-01-08 PROCEDURE — 63600175 PHARM REV CODE 636 W HCPCS

## 2017-01-08 PROCEDURE — 99900035 HC TECH TIME PER 15 MIN (STAT)

## 2017-01-08 PROCEDURE — 85007 BL SMEAR W/DIFF WBC COUNT: CPT

## 2017-01-08 PROCEDURE — 36000685 HC CESAREAN SECTION LEVEL I

## 2017-01-08 PROCEDURE — 82803 BLOOD GASES ANY COMBINATION: CPT

## 2017-01-08 PROCEDURE — 85027 COMPLETE CBC AUTOMATED: CPT

## 2017-01-08 PROCEDURE — 25000003 PHARM REV CODE 250: Performed by: ANESTHESIOLOGY

## 2017-01-08 PROCEDURE — 25000003 PHARM REV CODE 250: Performed by: ADVANCED PRACTICE MIDWIFE

## 2017-01-08 PROCEDURE — 25000003 PHARM REV CODE 250: Performed by: STUDENT IN AN ORGANIZED HEALTH CARE EDUCATION/TRAINING PROGRAM

## 2017-01-08 PROCEDURE — 11000001 HC ACUTE MED/SURG PRIVATE ROOM

## 2017-01-08 PROCEDURE — 72100003 HC LABOR CARE, EA. ADDL. 8 HRS

## 2017-01-08 RX ORDER — ONDANSETRON 2 MG/ML
4 INJECTION INTRAMUSCULAR; INTRAVENOUS EVERY 12 HOURS PRN
Status: DISCONTINUED | OUTPATIENT
Start: 2017-01-08 | End: 2017-01-10 | Stop reason: HOSPADM

## 2017-01-08 RX ORDER — IBUPROFEN 600 MG/1
600 TABLET ORAL EVERY 6 HOURS
Status: DISCONTINUED | OUTPATIENT
Start: 2017-01-08 | End: 2017-01-08

## 2017-01-08 RX ORDER — KETOROLAC TROMETHAMINE 30 MG/ML
30 INJECTION, SOLUTION INTRAMUSCULAR; INTRAVENOUS EVERY 6 HOURS
Status: COMPLETED | OUTPATIENT
Start: 2017-01-08 | End: 2017-01-08

## 2017-01-08 RX ORDER — LIDOCAINE HCL/EPINEPHRINE/PF 2%-1:200K
VIAL (ML) INJECTION
Status: DISCONTINUED | OUTPATIENT
Start: 2017-01-07 | End: 2017-01-08

## 2017-01-08 RX ORDER — SODIUM CHLORIDE, SODIUM LACTATE, POTASSIUM CHLORIDE, CALCIUM CHLORIDE 600; 310; 30; 20 MG/100ML; MG/100ML; MG/100ML; MG/100ML
INJECTION, SOLUTION INTRAVENOUS CONTINUOUS
Status: ACTIVE | OUTPATIENT
Start: 2017-01-08 | End: 2017-01-08

## 2017-01-08 RX ORDER — OXYCODONE AND ACETAMINOPHEN 5; 325 MG/1; MG/1
1 TABLET ORAL EVERY 4 HOURS PRN
Status: DISCONTINUED | OUTPATIENT
Start: 2017-01-08 | End: 2017-01-10 | Stop reason: HOSPADM

## 2017-01-08 RX ORDER — DIPHENHYDRAMINE HCL 25 MG
25 CAPSULE ORAL EVERY 4 HOURS PRN
Status: DISCONTINUED | OUTPATIENT
Start: 2017-01-08 | End: 2017-01-10 | Stop reason: HOSPADM

## 2017-01-08 RX ORDER — ADHESIVE BANDAGE
30 BANDAGE TOPICAL 2 TIMES DAILY PRN
Status: DISCONTINUED | OUTPATIENT
Start: 2017-01-09 | End: 2017-01-10 | Stop reason: HOSPADM

## 2017-01-08 RX ORDER — BISACODYL 10 MG
10 SUPPOSITORY, RECTAL RECTAL ONCE AS NEEDED
Status: ACTIVE | OUTPATIENT
Start: 2017-01-08 | End: 2017-01-08

## 2017-01-08 RX ORDER — HYDROCORTISONE 25 MG/G
CREAM TOPICAL 3 TIMES DAILY PRN
Status: DISCONTINUED | OUTPATIENT
Start: 2017-01-08 | End: 2017-01-10 | Stop reason: HOSPADM

## 2017-01-08 RX ORDER — ACETAMINOPHEN 10 MG/ML
INJECTION, SOLUTION INTRAVENOUS
Status: DISCONTINUED | OUTPATIENT
Start: 2017-01-08 | End: 2017-01-08

## 2017-01-08 RX ORDER — DOCUSATE SODIUM 100 MG/1
200 CAPSULE, LIQUID FILLED ORAL 2 TIMES DAILY
Status: DISCONTINUED | OUTPATIENT
Start: 2017-01-08 | End: 2017-01-10 | Stop reason: HOSPADM

## 2017-01-08 RX ORDER — MISOPROSTOL 200 UG/1
200 TABLET ORAL EVERY 6 HOURS
Status: DISCONTINUED | OUTPATIENT
Start: 2017-01-08 | End: 2017-01-09

## 2017-01-08 RX ORDER — DIPHENHYDRAMINE HYDROCHLORIDE 50 MG/ML
25 INJECTION INTRAMUSCULAR; INTRAVENOUS EVERY 4 HOURS PRN
Status: DISCONTINUED | OUTPATIENT
Start: 2017-01-08 | End: 2017-01-10 | Stop reason: HOSPADM

## 2017-01-08 RX ORDER — OXYCODONE HYDROCHLORIDE 5 MG/1
5 TABLET ORAL EVERY 4 HOURS PRN
Status: DISPENSED | OUTPATIENT
Start: 2017-01-08 | End: 2017-01-09

## 2017-01-08 RX ORDER — OXYCODONE AND ACETAMINOPHEN 10; 325 MG/1; MG/1
1 TABLET ORAL EVERY 4 HOURS PRN
Status: DISCONTINUED | OUTPATIENT
Start: 2017-01-08 | End: 2017-01-10 | Stop reason: HOSPADM

## 2017-01-08 RX ORDER — OXYCODONE HYDROCHLORIDE 5 MG/1
10 TABLET ORAL EVERY 4 HOURS PRN
Status: ACTIVE | OUTPATIENT
Start: 2017-01-08 | End: 2017-01-09

## 2017-01-08 RX ORDER — ONDANSETRON 8 MG/1
8 TABLET, ORALLY DISINTEGRATING ORAL EVERY 8 HOURS PRN
Status: DISCONTINUED | OUTPATIENT
Start: 2017-01-08 | End: 2017-01-10 | Stop reason: HOSPADM

## 2017-01-08 RX ORDER — OXYTOCIN/RINGER'S LACTATE 20/1000 ML
41.65 PLASTIC BAG, INJECTION (ML) INTRAVENOUS CONTINUOUS
Status: ACTIVE | OUTPATIENT
Start: 2017-01-08 | End: 2017-01-08

## 2017-01-08 RX ORDER — ACETAMINOPHEN 325 MG/1
650 TABLET ORAL EVERY 6 HOURS
Status: COMPLETED | OUTPATIENT
Start: 2017-01-08 | End: 2017-01-09

## 2017-01-08 RX ORDER — PHENYLEPHRINE HYDROCHLORIDE 10 MG/ML
INJECTION INTRAVENOUS
Status: DISCONTINUED | OUTPATIENT
Start: 2017-01-07 | End: 2017-01-08

## 2017-01-08 RX ORDER — IBUPROFEN 600 MG/1
600 TABLET ORAL EVERY 6 HOURS
Status: DISCONTINUED | OUTPATIENT
Start: 2017-01-09 | End: 2017-01-10 | Stop reason: HOSPADM

## 2017-01-08 RX ORDER — OXYTOCIN 10 [USP'U]/ML
INJECTION, SOLUTION INTRAMUSCULAR; INTRAVENOUS
Status: DISCONTINUED | OUTPATIENT
Start: 2017-01-08 | End: 2017-01-08

## 2017-01-08 RX ORDER — AMOXICILLIN 250 MG
1 CAPSULE ORAL NIGHTLY PRN
Status: DISCONTINUED | OUTPATIENT
Start: 2017-01-08 | End: 2017-01-10 | Stop reason: HOSPADM

## 2017-01-08 RX ORDER — MORPHINE SULFATE 0.5 MG/ML
INJECTION, SOLUTION EPIDURAL; INTRATHECAL; INTRAVENOUS
Status: DISCONTINUED | OUTPATIENT
Start: 2017-01-08 | End: 2017-01-08

## 2017-01-08 RX ORDER — SIMETHICONE 80 MG
1 TABLET,CHEWABLE ORAL EVERY 6 HOURS PRN
Status: DISCONTINUED | OUTPATIENT
Start: 2017-01-08 | End: 2017-01-10 | Stop reason: HOSPADM

## 2017-01-08 RX ADMIN — OXYTOCIN 3 UNITS: 10 INJECTION, SOLUTION INTRAMUSCULAR; INTRAVENOUS at 12:01

## 2017-01-08 RX ADMIN — MISOPROSTOL 200 MCG: 200 TABLET ORAL at 05:01

## 2017-01-08 RX ADMIN — Medication 1.5 MG: at 12:01

## 2017-01-08 RX ADMIN — ZOLPIDEM TARTRATE 5 MG: 5 TABLET, FILM COATED ORAL at 09:01

## 2017-01-08 RX ADMIN — MISOPROSTOL 200 MCG: 200 TABLET ORAL at 06:01

## 2017-01-08 RX ADMIN — ACETAMINOPHEN 650 MG: 325 TABLET ORAL at 05:01

## 2017-01-08 RX ADMIN — DIPHENHYDRAMINE HYDROCHLORIDE 25 MG: 25 CAPSULE ORAL at 08:01

## 2017-01-08 RX ADMIN — KETOROLAC TROMETHAMINE 30 MG: 30 INJECTION, SOLUTION INTRAMUSCULAR at 12:01

## 2017-01-08 RX ADMIN — PHENYLEPHRINE HYDROCHLORIDE 100 MCG: 10 INJECTION INTRAVENOUS at 12:01

## 2017-01-08 RX ADMIN — OXYCODONE HYDROCHLORIDE 5 MG: 5 TABLET ORAL at 02:01

## 2017-01-08 RX ADMIN — DOCUSATE SODIUM 200 MG: 100 CAPSULE, LIQUID FILLED ORAL at 09:01

## 2017-01-08 RX ADMIN — SODIUM CHLORIDE, SODIUM LACTATE, POTASSIUM CHLORIDE, AND CALCIUM CHLORIDE: .6; .31; .03; .02 INJECTION, SOLUTION INTRAVENOUS at 06:01

## 2017-01-08 RX ADMIN — ACETAMINOPHEN 650 MG: 325 TABLET ORAL at 12:01

## 2017-01-08 RX ADMIN — KETOROLAC TROMETHAMINE 30 MG: 30 INJECTION, SOLUTION INTRAMUSCULAR at 05:01

## 2017-01-08 RX ADMIN — SODIUM CHLORIDE, SODIUM LACTATE, POTASSIUM CHLORIDE, AND CALCIUM CHLORIDE 2600 ML: .6; .31; .03; .02 INJECTION, SOLUTION INTRAVENOUS at 12:01

## 2017-01-08 RX ADMIN — MISOPROSTOL 200 MCG: 200 TABLET ORAL at 12:01

## 2017-01-08 RX ADMIN — OXYCODONE HYDROCHLORIDE 5 MG: 5 TABLET ORAL at 05:01

## 2017-01-08 RX ADMIN — METHYLERGONOVINE MALEATE 200 MCG: 0.2 INJECTION, SOLUTION INTRAMUSCULAR; INTRAVENOUS at 12:01

## 2017-01-08 RX ADMIN — ACETAMINOPHEN 650 MG: 325 TABLET ORAL at 06:01

## 2017-01-08 RX ADMIN — ACETAMINOPHEN 1000 MG: 10 INJECTION, SOLUTION INTRAVENOUS at 12:01

## 2017-01-08 RX ADMIN — DOCUSATE SODIUM 200 MG: 100 CAPSULE, LIQUID FILLED ORAL at 08:01

## 2017-01-08 RX ADMIN — KETOROLAC TROMETHAMINE 30 MG: 30 INJECTION, SOLUTION INTRAMUSCULAR at 06:01

## 2017-01-08 NOTE — ANESTHESIA POSTPROCEDURE EVALUATION
"Anesthesia Post Evaluation    Patient: Maria Del Rosario Vasquez    Procedure(s) Performed: Procedure(s) (LRB):  DELIVERY- SECTION (N/A)    Final Anesthesia Type: epidural  Patient location during evaluation: labor & delivery  Patient participation: Yes- Able to Participate  Level of consciousness: awake and alert and oriented  Post-procedure vital signs: reviewed and stable  Pain management: adequate  Airway patency: patent  PONV status at discharge: No PONV  Anesthetic complications: no      Cardiovascular status: blood pressure returned to baseline and hemodynamically stable  Respiratory status: unassisted, room air and spontaneous ventilation  Hydration status: euvolemic  Follow-up not needed.        Visit Vitals    /61    Pulse 88    Temp 36.7 °C (98 °F) (Oral)    Resp 18    Ht 5' 4" (1.626 m)    Wt 85.3 kg (188 lb)    LMP 2016    SpO2 97%    Breastfeeding No    BMI 32.27 kg/m2       Pain/Alejandrina Score: Pain Assessment Performed: Yes (2017  6:20 AM)  Presence of Pain: complains of pain/discomfort (2017  6:20 AM)  Pain Rating Prior to Med Admin: 4 (2017 12:00 PM)      "

## 2017-01-08 NOTE — PROGRESS NOTES
POSTPARTUM PROGRESS NOTE     Maria Del Rosario Vasquez is a 21 y.o. female POD #1 status post Primary  section at 41w1d in a pregnancy complicated by PROM and GBS. Patient was laboring with the midwives when arrest of dilation necessitated  delivery.    Patient is doing well this morning. She denies nausea, vomiting, fever or chills.  Patient reports moderate abdominal pain that is well relieved by oral pain medications. Lochia is mild to moderate  and stable. Patient is voiding with a diaz catheter and has not yet ambulated. She has passed flatus, and has not had BM.  Patient does plan to breast feed. Deferring contraception to the midwives. She declines circumcision.    Objective:       Temp:  [97.3 °F (36.3 °C)-99.7 °F (37.6 °C)] 99.7 °F (37.6 °C)  Pulse:  [] 108  Resp:  [18] 18  BP: (115-158)/() 134/89    General:   alert, appears stated age and cooperative   Lungs:   clear to auscultation bilaterally   Heart:   regular rate and rhythm, S1, S2 normal, no murmur, click, rub or gallop   Abdomen:  soft, non-tender; bowel sounds normal; no masses,  no organomegaly   Uterus:  firm located at the umblicus.    Incision: Bandage in place, clean, dry and intact; no erythema or induration   Extremities: peripheral pulses normal, no pedal edema, no clubbing or cyanosis     Lab Review  No results found for this or any previous visit (from the past 4 hour(s)).    I/O    Intake/Output Summary (Last 24 hours) at 17 0608  Last data filed at 17 0029   Gross per 24 hour   Intake          2954.17 ml   Output             1650 ml   Net          1304.17 ml        Assessment:     Patient Active Problem List   Diagnosis    Prenatal care in third trimester    Pap smear, low-risk    GBS (group B Streptococcus carrier), +RV culture, currently pregnant    Elevated glucose    PROM (premature rupture of membranes)    Full-term premature rupture of membranes     Plan:   1. Postpartum care:  - Patient doing  well. Continue routine management and advances.  - Continue PO pain meds. Pain well controlled.  - Heme: CBC on 1/6/17: 10.5/13.1/38.9/214  - postpartum H/h pending  - Encourage ambulation  - Circumcision declined  - Contraception deferred to MW  - Lactation consult PRN    Dispo: As patient meets milestones, will plan to discharge POD #2-4.    Kelin Crump MD, PhD  OBGYN, PGY-1

## 2017-01-08 NOTE — LACTATION NOTE
"   01/08/17 1100   Maternal Infant Assessment   Breast Density Bilateral:;soft   Nipple(s) Right:;everted   Infant Assessment   Sucking Reflex present   Rooting Reflex present   LATCH Score   Latch 1-->repeated attempts, holds nipple in mouth, stimulate to suck   Audible Swallowing 2-->spontaneous and intermittent (24 hrs old)   Type Of Nipple 2-->everted (after stimulation)   Comfort (Breast/Nipple) 2-->soft/nontender   Hold (Positioning) 1-->minimal assist, teach one side: mother does other, staff holds   Score (less than 7 for 2/more consecutive times, consult Lactation Consultant) 8       Number Scale   Presence of Pain denies   Location nipple(s)   Pain Rating: Rest 0   Pain Rating: Activity 0   Maternal Infant Feeding   Infant Positioning clutch/"football"   Time Spent (min) 15-30 min   Latch Assistance yes   Breastfeeding History   Currently Breastfeeding yes   Feeding Infant   Feeding Readiness Cues hand to mouth movements;rooting   Effective Latch During Feeding yes   Audible Swallow yes   Suck/Swallow Coordination present   Lactation Referrals   Lactation Consult Breastfeeding assessment;Initial assessment   Lactation Interventions   Attachment Promotion breastfeeding assistance provided;counseling provided   Breastfeeding Assistance infant latch-on verified;infant suck/swallow verified;assisted with positioning   Maternal Breastfeeding Support lactation counseling provided   Latch Promotion positioning assisted;infant moved to breast;suck stimulated with colostrum drop   Lactation note: assisted pt with positioning and latch. After several attempts baby was able to latch to breast and rhythmically suck. Breastfeeding education given. Questions answered.    "

## 2017-01-08 NOTE — PROGRESS NOTES
CNM Social Note  Doing well this am  Got a little rest  Baby latching well to breast  Pain well controlled with po rx  No complaints

## 2017-01-08 NOTE — TRANSFER OF CARE
"Anesthesia Transfer of Care Note    Patient: Maria Del Rosario Vasquez    Procedure(s) Performed: Procedure(s) (LRB):  DELIVERY- SECTION (N/A)    Patient location: Labor and Delivery    Anesthesia Type: epidural    Transport from OR: Transported from OR on room air with adequate spontaneous ventilation    Post pain: adequate analgesia    Post assessment: no apparent anesthetic complications    Post vital signs: stable    Level of consciousness: awake and alert    Nausea/Vomiting: no nausea/vomiting    Complications: none          Last vitals:   Visit Vitals    /89    Pulse 108    Temp 37.6 °C (99.7 °F) (Temporal)    Resp 18    Ht 5' 4" (1.626 m)    Wt 85.3 kg (188 lb)    LMP 2016    SpO2 100%    Breastfeeding No    BMI 32.27 kg/m2     "

## 2017-01-08 NOTE — PROGRESS NOTES
In reviewing orders before administering 0600 doses of Tylenol and Toradol, held orders found which would D/C these medications. Called anesthesia and spoke w/Dr. Grimm who states to refrain from releasing held orders for now (can release after 24 hours), OK to administer Tylenol and Toradol at this time and for the first 24 hours. Will administer medications and continue to monitor.

## 2017-01-08 NOTE — L&D DELIVERY NOTE
OPERATIVE NOTE      SECTION    DATE OF PROCEDURE: 2017    PREOPERATIVE DIAGNOSES:   1. Full-term premature rupture of membranes, unspecified duration to onset of labor    2. PROM (premature rupture of membranes)        POSTOPERATIVE DIAGNOSES:   1. Full-term premature rupture of membranes, unspecified duration to onset of labor    2. PROM (premature rupture of membranes)      SURGEON : GRICELDA Tracy MD     ASSISTANT: Kelin Crump MD, PhD    PROCEDURE: Primary low transverse  section via Pfannenstiel incision.     ANESTHESIA: Spinal anesthesia    ESTIMATED BLOOD LOSS: 1200 cc    UOP: 150 cc    FLUIDS: 1000 cc    INDICATIONS: Ms. Maria Del Rosario Vasquez is a 21 y.o.  female with IUP 41w1d weeks' gestational age.    FINDINGS: male infant in cephalic presentation. Infant weighing 3684g with Apgars of 6 and 9 at 1 and 5 minutes respectively. Normal appearing uterus, tubes and ovaries.     PROCEDURE IN DETAIL: The patient was taken back to the Operating Room where anesthesia was found to be adequate. The patient was prepped and draped in normal sterile fashion in dorsal supine position with leftward tilt.  Skin incision was made with scalpel and this incision was taken down to the underlying fascia with the knife. Incision was made in the midline of the fascia with inside knife; the incision was extended laterally using curved Cantrell scissors on both sides. Using Kocher clamps, the superior aspect of the fascia was grasped and tented up and the underlying rectus muscle was  off the fascia bluntly with the assistance of curved Cantrell scissors. In a similar fashion, the inferior aspect of the fascia was grasped with Kocher's, tented up and the underlying rectus muscle was  off bluntly with the assistance of curved Cantrell scissors. The muscle was then incised in midline and  bluntly. Peritoneum was identified, tented up with hemostats and entered sharply with Metzenbaum scissors  and then extended superiorly and inferiorly bluntly. Bladder blade was inserted and the lower uterine segment identified. A low transverse incision was made on the uterus with a scalpel. The amniotic sac was intact and entered bluntly. The infant was found to be in a cephalic presentation. The head was then delivered atraumatically and the infant shortly followed. The cord was clamped and cut and the infant was suctioned with bulb and handed off to the awaiting nurses. Cord gas and blood were collected and sent.   The placenta was then delivered manually. The uterus was then exteriorized and cleared of all debris and clots. The hysterotomy was then closed using #1 chromic in a running locked fashion. The uterus was found to be atonic and an additional 20U of pitocin were added to the bag running open for a total of 40U pitocin. Bleeding persisted and 0.2 mg methergine was administered to the left thigh IM. Tone improved and bleeding slowed thereafter. Good hemostasis was noted at both the angles and at the repaired hysterotomy incision. The posterior aspect of the uterus was irrigated and cleared of clots. The uterus was then replaced into the abdomen and the gutters were irrigated and cleared of all clots. The peritoneum was then reapproximated with 2-0 vicryl. The muscle was then reapproximated using #1 chromic. The fascia was then reapproximated using #1 PDS in a running fashion and tied at the midline. Good hemostasis was noted throughout. The skin was then closed using 4-0 Monocryl in a running fashion.   Sponge, lap, and needle counts were good x 2. The patient tolerated the procedure well and was transferred over to the recovery area in stable condition.     Delivery Information for  Seth Vasquez    Birth information:  YOB: 2018   Time of birth: 11:57 PM   Sex: male   Head Delivery Date/Time: 2017 11:56 PM   Delivery type: , Low Transverse   Gestational Age: 41w0d    Delivery  Providers    Delivering clinician:  KATHERINE CORTÉS   Other personnel:   Provider Role   DAPHNE TREJO                   [place] Measurements    Weight:  3684 g Length:  52.7 cm   Head circum.:  35.6 cm Chest circum.:  35.6 cm           Assessment    Living status:  Yes   Apgars    1 Minute:   5 Minute:   10 Minute 15 Minute 20 Minute   Skin Color: 0  1       Heart Rate: 2  2       Reflex Irritability: 1  2       Muscle Tone: 1  2       Respiratory Effort: 2  2       Total: 6  9                  Apgars Assigned By:  NICU          Assisted Delivery Details:    Forceps attempted?:  No   Vacuum extractor attempted?:  Yes   Vacuum type:  Kiwi   Vacuum application location:  Outlet   First attempt time vacuum applied:  2017 2356   Number of pop offs:  1   Number of pulls with vacuum:  1   Total vacuum application time:  4 seconds   Vacuum applied by:  MILANA   Failed vacuum delivery?:  No                   Presentation and Position    Presentation: Vertex   Position:                    Interventions/Resuscitation    Method:  NICU Attended        Cord    Complications:  Nuchal   Nuchal Intervention:  reduced   Nuchal Cord Description:  loose nuchal cord   Number of Loops:  1   Delayed Cord Clamping?:  No   Cord Clamped Date/Time:  2017 11:57 PM   Cord Blood Disposition:  Sent with Baby   Gases Sent?:  Yes   Stem Cell Collection (by MD):  No        Placenta    Date and time:  2017 11:59 PM   Removal:  Manual removal   Appearance:  Intact   Placenta disposition:  Discarded            Labor Events:       labor: No     Labor Onset Date/Time:         Dilation Complete Date/Time:         Start Pushing Date/Time:       Rupture Date/Time:              Rupture type:           Fluid Amount:        Fluid Color: clear      Fluid Odor: none      Membrane Status (PeriCalm): PROM (Premature Rupture)      Rupture Date/Time (PeriCalm): 2017 05:20:00      Fluid Amount (PeriCalm):  Moderate      Fluid Color (PeriCalm): Clear       steroids: None     Antibiotics given for GBS: Yes     Induction: oxytocin;dinoprostone insert     Indications for induction:  Premature ROM;Prolonged ROM     Augmentation:       Indications for augmentation:       Labor complications: Failure to Progress in First Stage     Additional complications:          Cervical ripening:                     Delivery:      Episiotomy: None     Indication for Episiotomy:       Perineal Lacerations: 1st Repaired:      Periurethral Laceration: none Repaired:     Labial Laceration: none Repaired:     Sulcus Laceration: none Repaired:     Vaginal Laceration: No Repaired:     Cervical Laceration: No Repaired:     Repair suture:       Repair # of packets: 8     Blood loss (ml): 1200     Vaginal Sweep Performed: No     Surgicount Correct: Yes       Other providers:       Anesthesia    Method:  Epidural              Details (if applicable):  Trial of Labor Yes    Categorization: Primary    Priority: Routine   Indications for : Failure to Progress   Incision Type: Low Transverse     Additional  information:  Forceps:    Vacuum:    Breech:    Observed anomalies    Other (Comments):         Kelin Crump MD, PhD  OBGYN, PGY-1

## 2017-01-09 LAB — GENTAMICIN SERPL-MCNC: <0.5 UG/ML

## 2017-01-09 PROCEDURE — 25000003 PHARM REV CODE 250: Performed by: OBSTETRICS & GYNECOLOGY

## 2017-01-09 PROCEDURE — 25000003 PHARM REV CODE 250: Performed by: ANESTHESIOLOGY

## 2017-01-09 PROCEDURE — 11000001 HC ACUTE MED/SURG PRIVATE ROOM

## 2017-01-09 PROCEDURE — 25000003 PHARM REV CODE 250

## 2017-01-09 PROCEDURE — 80170 ASSAY OF GENTAMICIN: CPT

## 2017-01-09 PROCEDURE — 36415 COLL VENOUS BLD VENIPUNCTURE: CPT

## 2017-01-09 PROCEDURE — 25000003 PHARM REV CODE 250: Performed by: STUDENT IN AN ORGANIZED HEALTH CARE EDUCATION/TRAINING PROGRAM

## 2017-01-09 RX ORDER — CLINDAMYCIN PHOSPHATE 900 MG/50ML
900 INJECTION, SOLUTION INTRAVENOUS
Status: DISCONTINUED | OUTPATIENT
Start: 2017-01-09 | End: 2017-01-09

## 2017-01-09 RX ORDER — MISOPROSTOL 200 UG/1
200 TABLET ORAL EVERY 6 HOURS
Status: DISCONTINUED | OUTPATIENT
Start: 2017-01-09 | End: 2017-01-09

## 2017-01-09 RX ADMIN — DOCUSATE SODIUM 200 MG: 100 CAPSULE, LIQUID FILLED ORAL at 09:01

## 2017-01-09 RX ADMIN — MISOPROSTOL 200 MCG: 200 TABLET ORAL at 05:01

## 2017-01-09 RX ADMIN — IBUPROFEN 600 MG: 600 TABLET, FILM COATED ORAL at 05:01

## 2017-01-09 RX ADMIN — MISOPROSTOL 200 MCG: 200 TABLET ORAL at 12:01

## 2017-01-09 RX ADMIN — OXYCODONE HYDROCHLORIDE AND ACETAMINOPHEN 1 TABLET: 10; 325 TABLET ORAL at 08:01

## 2017-01-09 RX ADMIN — OXYCODONE HYDROCHLORIDE AND ACETAMINOPHEN 1 TABLET: 10; 325 TABLET ORAL at 04:01

## 2017-01-09 RX ADMIN — OXYCODONE AND ACETAMINOPHEN 1 TABLET: 5; 325 TABLET ORAL at 10:01

## 2017-01-09 RX ADMIN — IBUPROFEN 600 MG: 600 TABLET, FILM COATED ORAL at 12:01

## 2017-01-09 RX ADMIN — IBUPROFEN 600 MG: 600 TABLET, FILM COATED ORAL at 06:01

## 2017-01-09 RX ADMIN — ACETAMINOPHEN 650 MG: 325 TABLET ORAL at 12:01

## 2017-01-09 RX ADMIN — OXYCODONE AND ACETAMINOPHEN 1 TABLET: 5; 325 TABLET ORAL at 03:01

## 2017-01-09 RX ADMIN — DOCUSATE SODIUM 200 MG: 100 CAPSULE, LIQUID FILLED ORAL at 08:01

## 2017-01-09 NOTE — PROGRESS NOTES
POSTPARTUM PROGRESS NOTE     Maria Del Rosario Vasquez is a 21 y.o. female POD #2 status post Primary  section at 41w1d in a pregnancy complicated by PROM and GBS. Patient was laboring with the midwives when arrest of dilation necessitated  delivery.    Patient has no complaints this morning. She denies nausea, vomiting, fever or chills.  Patient reports moderate abdominal pain that is well relieved by oral pain medications. Lochia is mild to moderate  and stable. Patient is voiding and ambulating without difficulty. She has passed flatus, and has not had BM.  Patient does plan to breast feed. Deferring contraception to the midwives. She declines circumcision.    Objective:       Temp:  [98 °F (36.7 °C)-99 °F (37.2 °C)] 98.7 °F (37.1 °C)  Pulse:  [] 120  Resp:  [16-18] 18  BP: (113-140)/(61-83) 140/77    General:   alert, appears stated age and cooperative   Lungs:   clear to auscultation bilaterally   Heart:   regular rate and rhythm, S1, S2 normal, no murmur, click, rub or gallop   Abdomen:  soft, non-tender; bowel sounds normal; no masses,  no organomegaly   Uterus:  firm located at the umblicus, mild fundal tenderness    Incision: clean, dry and intact; no erythema or induration   Extremities: peripheral pulses normal, no pedal edema, no clubbing or cyanosis     Lab Review  No results found for this or any previous visit (from the past 4 hour(s)).    I/O    Intake/Output Summary (Last 24 hours) at 17 0631  Last data filed at 17 2100   Gross per 24 hour   Intake                0 ml   Output              950 ml   Net             -950 ml        Assessment:     Patient Active Problem List   Diagnosis    Prenatal care in third trimester    Pap smear, low-risk    GBS (group B Streptococcus carrier), +RV culture, currently pregnant    Elevated glucose    PROM (premature rupture of membranes)    Full-term premature rupture of membranes     Plan:   1. Postpartum care:  - Patient doing well.  Continue routine management and advances.  - Continue PO pain meds. Pain well controlled.  - Heme: CBC on 1/6/17: 10.5/13.1/38.9/214  - Postpartum CBC: 22.9/11/33.1/135  - Encourage ambulation  - Circumcision declined  - Contraception deferred to MW  - Lactation consult PRN    2. Possible endometritis  - mild fundal tenderness on exam  - Patient tachycardic for past 24 hours. Afebrile.  - Elevated WBC  - Consider starting antibiotic therapy    Dispo: As patient meets milestones, will plan to discharge POD #3-4.      Erwin Juan MD  PGY-1 OB/GYN  [phone]

## 2017-01-09 NOTE — PROGRESS NOTES
POSTPARTUM PROGRESS NOTE     Maria Del Rosario Vasquez is a 21 y.o. female POD #1 status post Primary  section 2/2 arrest of labor at 41w1d in a pregnancy complicated by PROM and GBS. Patient was laboring with the midwives when arrest of dilation necessitated  delivery.    Patient is doing well this morning. She denies nausea, vomiting, fever or chills.  Patient reports moderate abdominal pain that is well relieved by oral pain medications. Lochia is mild to moderate  and stable. Patient is voiding with a diaz catheter and has not yet ambulated. She has passed flatus, and has not had BM.  Patient does plan to breast feed. Deferring contraception to the midwives. She declines circumcision.    Objective:       Temp:  [98 °F (36.7 °C)-99 °F (37.2 °C)] 99 °F (37.2 °C)  Pulse:  [] 108  Resp:  [16-18] 18  BP: (113-136)/(61-83) 121/76    General:   alert, appears stated age and cooperative   Lungs:   no increased work of breathing   Heart:   tachycardic   Abdomen:  soft, nontender   Uterus:  firm located at the umblicus.    Incision: clean, dry and intact; no erythema or induration   Extremities: peripheral pulses normal, no pedal edema, no clubbing or cyanosis     Lab Review  No results found for this or any previous visit (from the past 4 hour(s)).    I/O    Intake/Output Summary (Last 24 hours) at 17 0332  Last data filed at 17 2100   Gross per 24 hour   Intake            872.9 ml   Output             1250 ml   Net           -377.1 ml        Assessment:     Patient Active Problem List   Diagnosis    Prenatal care in third trimester    Pap smear, low-risk    GBS (group B Streptococcus carrier), +RV culture, currently pregnant    Elevated glucose    PROM (premature rupture of membranes)    Full-term premature rupture of membranes     Plan:   1. Postpartum care:  - Patient doing well. Continue routine management and advances.  - Continue PO pain meds. Pain well controlled.  - Heme: CBC on  1/6/17: 10.5/13.1/38.9/214--> 22.49/11.0/33.1/185  - Encourage ambulation  - Circumcision declined  - Contraception deferred to MW  - Lactation consult PRN    Dispo: As patient meets milestones, will plan to discharge POD #2-4.    Mary Cantor MD  PGY-2 OB/GYN  [phone]      Abisai Dunbar MD

## 2017-01-10 VITALS
BODY MASS INDEX: 32.1 KG/M2 | DIASTOLIC BLOOD PRESSURE: 73 MMHG | RESPIRATION RATE: 18 BRPM | TEMPERATURE: 98 F | WEIGHT: 188 LBS | SYSTOLIC BLOOD PRESSURE: 132 MMHG | HEIGHT: 64 IN | HEART RATE: 98 BPM | OXYGEN SATURATION: 98 %

## 2017-01-10 PROBLEM — Z98.891 S/P CESAREAN SECTION: Status: ACTIVE | Noted: 2017-01-10

## 2017-01-10 PROBLEM — O42.90 PROM (PREMATURE RUPTURE OF MEMBRANES): Status: RESOLVED | Noted: 2017-01-06 | Resolved: 2017-01-10

## 2017-01-10 PROCEDURE — 25000003 PHARM REV CODE 250: Performed by: STUDENT IN AN ORGANIZED HEALTH CARE EDUCATION/TRAINING PROGRAM

## 2017-01-10 PROCEDURE — 25000003 PHARM REV CODE 250: Performed by: OBSTETRICS & GYNECOLOGY

## 2017-01-10 RX ORDER — OXYCODONE AND ACETAMINOPHEN 5; 325 MG/1; MG/1
1 TABLET ORAL EVERY 4 HOURS PRN
Qty: 20 TABLET | Refills: 0 | Status: SHIPPED | OUTPATIENT
Start: 2017-01-10 | End: 2018-04-24

## 2017-01-10 RX ORDER — IBUPROFEN 600 MG/1
600 TABLET ORAL EVERY 6 HOURS
Qty: 30 TABLET | Refills: 2 | Status: SHIPPED | OUTPATIENT
Start: 2017-01-10 | End: 2018-04-24

## 2017-01-10 RX ADMIN — DOCUSATE SODIUM 200 MG: 100 CAPSULE, LIQUID FILLED ORAL at 10:01

## 2017-01-10 RX ADMIN — OXYCODONE HYDROCHLORIDE AND ACETAMINOPHEN 1 TABLET: 10; 325 TABLET ORAL at 10:01

## 2017-01-10 RX ADMIN — IBUPROFEN 600 MG: 600 TABLET, FILM COATED ORAL at 12:01

## 2017-01-10 RX ADMIN — IBUPROFEN 600 MG: 600 TABLET, FILM COATED ORAL at 09:01

## 2017-01-10 NOTE — PLAN OF CARE
Problem: Patient Care Overview  Goal: Plan of Care Review  Outcome: Outcome(s) achieved Date Met:  01/10/17  Pt tolerating regular diet, ambulating well, voiding, caring for infant and breastfeeding,  pain well controlled. Prepared for discharge home. VSS  Patient discharged home per MD orders via WC with transport with infant in arms.

## 2017-01-10 NOTE — PROGRESS NOTES
Dr. Juan notified of patient c/o dizziness and lightheadedness during/ after shower. VSS, patient lying in bed, resting, encouraged po fluids and to eat something with pain meds.  Will continue to monitor.

## 2017-01-10 NOTE — DISCHARGE SUMMARY
Delivery Discharge Summary  Obstetrics      Primary OB Clinician: Cathleen Villaseñor CNM    Admission date: 2017  Discharge date: 01/10/2017    Disposition: To home, self care    Admit Dx:      Patient Active Problem List   Diagnosis    Prenatal care in third trimester    Pap smear, low-risk    GBS (group B Streptococcus carrier), +RV culture, currently pregnant    Elevated glucose    PROM (premature rupture of membranes)    Full-term premature rupture of membranes     Discharge Dx:    Patient Active Problem List   Diagnosis    Prenatal care in third trimester    Pap smear, low-risk    GBS (group B Streptococcus carrier), +RV culture, currently pregnant    Elevated glucose    PROM (premature rupture of membranes)    Full-term premature rupture of membranes       Procedure: Primary , due to arrest of dilation     Hospital Course:  Maria Del Rosario Vasquez is a 21 y.o. now , POD #3 who was admitted by the midwife service on 2017 at 41w0d for normal labor. On initial assessment, vital signs were stable and physical exam was normal. Infant was in cephalic presentation. Patient was subsequently admitted to labor and delivery unit with signed consents. Labor course was managed cervidil and pitocin, but arrested at 4cm dilation and the decision was made to proceed to the OR for 1LTCS.  Patient delivered a single viable  male. Please see delivery note for further details. Pt was in stable condition post delivery and was transferred to the Mother-Baby Unit. Her postpartum course was uncomplicated, though she was monitored for endometritis due to an elevated WBC and mild tachycardia. The tachycardia improved and she remained afebrile and antibiotic therapy was not indicated. On discharge day, patient's pain is controlled with oral pain medications. Pt is tolerating ambulation without SOB or CP, and PO diet without N/V. Reports lochia is mild. Denies any HA, vision changes, F/C, LE swelling.  Denies any breast pain/soreness.  Pt in stable condition and ready for discharge. She has been instructed to continue pain medications as needed and to follow up with the midwives in 6 weeks with her obstetrics provider.    Pertinent studies:  Postpartum CBC  Lab Results   Component Value Date    WBC 22.49 (H) 2017    HGB 11.0 (L) 2017    HCT 33.1 (L) 2017    MCV 91 2017     2017     Tubal Ligation: n/a  Feeding Method: breast  Rh Immune Globulin Given(O POS): N/A  Rubella Vaccine Given: N/A - immune  Tdap Vaccine Given: ordered to be given at discharge    Delivery:    Episiotomy: None   Lacerations: 1st   Repair suture:     Repair # of packets: 8   Blood loss (ml): 0     Birth information:  YOB: 2018   Time of birth: 11:57 PM   Sex: male   Delivery type: , Low Transverse   Gestational Age: 41w0d    Delivery Clinician:      Other providers:       Additional  information:  Forceps:    Vacuum:    Breech:    Observed anomalies      Living?:           APGARS  One minute Five minutes Ten minutes   Skin color:         Heart rate:         Grimace:         Muscle tone:         Breathing:         Totals: 6  9        Placenta: Delivered:       appearance      Patient Instructions:   Current Discharge Medication List      START taking these medications    Details   ibuprofen (ADVIL,MOTRIN) 600 MG tablet Take 1 tablet (600 mg total) by mouth every 6 (six) hours.  Qty: 30 tablet, Refills: 2    Associated Diagnoses: Full-term premature rupture of membranes, unspecified duration to onset of labor; PROM (premature rupture of membranes)      oxycodone-acetaminophen (PERCOCET) 5-325 mg per tablet Take 1 tablet by mouth every 4 (four) hours as needed.  Qty: 20 tablet, Refills: 0    Associated Diagnoses: Full-term premature rupture of membranes, unspecified duration to onset of labor; PROM (premature rupture of membranes)         CONTINUE these medications which have NOT  CHANGED    Details   PNV CMB#21/IRON/FOLIC ACID (PRENATAL COMPLETE ORAL) Take by mouth.      terconazole (TERAZOL 7) 0.4 % Crea Place 1 applicator vaginally once daily.  Qty: 45 g, Refills: 1    Associated Diagnoses: Vaginal itching             Discharge Procedure Orders  Diet general     Activity as tolerated     Call MD for:  increased confusion or weakness     Call MD for:  persistent dizziness, light-headedness, or visual disturbances     Call MD for:  worsening rash     Call MD for:  severe persistent headache     Call MD for:  difficulty breathing or increased cough     Call MD for:  severe uncontrolled pain     Call MD for:  persistent nausea and vomiting or diarrhea     Call MD for:  temperature >100.4       Follow-up Information     Follow up with Midwives In 6 weeks.    Why:  Post-partum check-up        Kelin Crump MD, PhD  OBGYN, PGY-1    Doing well, no complaints, ready for D/C.

## 2017-01-10 NOTE — PLAN OF CARE
Problem: Patient Care Overview  Goal: Plan of Care Review  Outcome: Ongoing (interventions implemented as appropriate)  Pt tolerating regular diet, ambulating well, voiding, caring for infant and breastfeeding,  pain controlled. VSS. Heat pack provided for back pain. Educated on incision care.

## 2017-01-10 NOTE — DISCHARGE INSTRUCTIONS
Breastfeeding Discharge Instructions       Feed the baby at the earliest sign of hunger or comfort  o Hands to mouth, sucking motions  o Rooting or searching for something to suck on  o Dont wait for crying - it is a late sign of hunger and comfort.     The feedings may be 8-12 times per 24hrs and will not follow a schedule   Avoid pacifiers and bottles for the first 4 weeks   Alternate the breast you start the feeding with, or start with the breast that feels the fullest   Switch breasts when the baby takes himself off the breast or falls asleep   Keep offering breasts until the baby looks full, no longer gives hunger signs, and stays asleep when placed on his back in the crib   If the baby is sleepy and wont wake for a feeding, put the baby skin-to-skin dressed in a diaper against the mothers bare chest   Sleep near your baby   The baby should be positioned and latched on to the breast correctly  o Chest-to-chest, chin in the breast  o Babys lips are flipped outward  o Babys mouth is stretched open wide like a shout  o Babys sucking should feel like tugging to the mother  - The baby should be drinking at the breast:  o You should hear swallowing or gulping throughout the feeding  o You should see milk on the babys lips when he comes off the breast  o Your breasts should be softer when the baby is finished feeding  o The baby should look relaxed at the end of feedings  o After the 4th day and your milk is in:  o The babys poop should turn bright yellow and be loose, watery, and seedy  o The baby should have at least 3-4 poops the size of the palm of your hand per day  o The baby should have at least 6-8 wet diapers per day  o The urine should be light yellow in color  You should drink when you are thirsty and eat a healthy diet when you are    hungry.     Take naps to get the rest you need.   Take medications and/or drink alcohol only with permission of your obstetrician    or the babys  pediatrician.  You can also call the Infant Risk Center,   ([phone]), Monday-Friday, 8am-5pm Central time, to get the most   up-to-date evidence-based information on the use of medications during   pregnancy and breastfeeding.      The baby should be examined by a pediatrician at 3-5 days of age.  Once your   milk comes in, the baby should be gaining at least ½ - 1oz each day and should be back to birthweight no later than 10-14 days of age.          Community Resources    [facility] Breastfeeding Warmline:  [phone]  Local [facility] clinics: provide incentives and breastpumps to eligible mothers  La Leche League International (LLLI):  mother-to-mother support group website        www.TOOVIA.[employer]ompharmaceuticals  Local La Leche League mother-to-mother support groups:        www.Buzz360.Your Style Unzipped        La Leche League [facility]         www.aranza@eventblimp.com  Dr. Fernando Somers website for latch videos and general information:        www.breastfeedinginc.ca  Infant Risk Center is a call center that provides information about the safety of taking medications while breastfeeding.  Call [phone], M-F, 8am-5pm, [place].  International Lactation Consultant Association provides resources for assistance:        www.ilca.org  [place] Breastfeeding Coalition provides informationand resources for parents  and the community    http://[facility] [place]breastfeeding.org     Bety mom provides resources for assistance:        www.nolamom.org  Partners for Healthy Babies:  3-334-744-BABY(2121)  [facility] provides a list of breastfeeding services by zip code:        www.[facility] CenterInnovasic Semiconductor.Sompharmaceuticals  Cafe au Lait:  876.363.4044 a breastfeeding support group for women of color

## 2017-01-10 NOTE — PROGRESS NOTES
POSTPARTUM PROGRESS NOTE     Maria Del Rosario Vasquez is a 21 y.o. female POD #3 status post Primary  section at 41w1d in a pregnancy complicated by PROM and GBS. Patient was laboring with the midwives when arrest of dilation necessitated  delivery.    Patient has no complaints this morning; states she would like to go home today. She denies nausea, vomiting, fever or chills. Patient reports mild abdominal pain that is well relieved by oral pain medications. Lochia is mild and decreasing. Patient is voiding and ambulating without difficulty. She has passed flatus, and has not had BM.  Patient does plan to breast feed. Deferring contraception to the midwives. She has declined circumcision.    Objective:       Temp:  [97.7 °F (36.5 °C)-98.2 °F (36.8 °C)] 97.9 °F (36.6 °C)  Pulse:  [] 100  Resp:  [16-18] 16  BP: (112-133)/(64-72) 133/70    General:   alert, appears stated age and cooperative   Lungs:   clear to auscultation bilaterally   Heart:   regular rate and rhythm, S1, S2 normal, no murmur, click, rub or gallop   Abdomen:  soft, non-tender; bowel sounds normal; no masses,  no organomegaly   Uterus:  firm located at the umblicus ; patient denies tenderness to palpation   Incision: clean, dry and intact; no erythema, induration, or drainage   Extremities: peripheral pulses normal, no pedal edema, no clubbing or cyanosis     Lab Review  No results found for this or any previous visit (from the past 4 hour(s)).    I/O    Intake/Output Summary (Last 24 hours) at 01/10/17 0616  Last data filed at 17 0747   Gross per 24 hour   Intake                0 ml   Output              700 ml   Net             -700 ml        Assessment:     Patient Active Problem List   Diagnosis    Prenatal care in third trimester    Pap smear, low-risk    GBS (group B Streptococcus carrier), +RV culture, currently pregnant    Elevated glucose    PROM (premature rupture of membranes)    Full-term premature rupture of  membranes     Plan:   1. Postpartum care:  - Patient doing well. Continue routine management and advances.  - Continue PO pain meds. Pain well controlled.  - Heme:     Recent Labs  Lab 01/06/17  2117 01/08/17  0553   WBC 10.48 22.49*   HGB 13.1 11.0*   HCT 38.9 33.1*   MCV 89 91    185      - Encourage ambulation  - Circumcision declined  - Contraception deferred to MW  - Lactation consult PRN    2. Possible endometritis  - yesterday, some concern for endometritis  - mild fundal tenderness on exam on POD #2 has improved this AM  - Patient has remained afebrile without antibiotic therapy  - Temp:  [97.7 °F (36.5 °C)-98.2 °F (36.8 °C)] 97.9 °F (36.6 °C)  - Pulse:  [] 100  - Resp:  [16-18] 16  - Elevated WBC    Dispo: As patient meets milestones, will plan to discharge POD #3.    Kelin Crump MD, PhD  OBGYN, PGY-1    Doing well, no problems, no questions  I have reviewed the resident's note, personally evaluated the patient and agree with the diagnosis and management plan.

## 2017-02-15 ENCOUNTER — POSTPARTUM VISIT (OUTPATIENT)
Dept: OBSTETRICS AND GYNECOLOGY | Facility: CLINIC | Age: 22
End: 2017-02-15
Payer: COMMERCIAL

## 2017-02-15 VITALS — SYSTOLIC BLOOD PRESSURE: 114 MMHG | DIASTOLIC BLOOD PRESSURE: 76 MMHG | HEIGHT: 64 IN

## 2017-02-15 DIAGNOSIS — Z30.09 GENERAL COUNSELING FOR INITIATION OF OTHER CONTRACEPTIVE MEASURES: ICD-10-CM

## 2017-02-15 PROCEDURE — 99215 OFFICE O/P EST HI 40 MIN: CPT | Mod: S$GLB,,, | Performed by: ADVANCED PRACTICE MIDWIFE

## 2017-02-15 PROCEDURE — 99999 PR PBB SHADOW E&M-EST. PATIENT-LVL II: CPT | Mod: PBBFAC,,, | Performed by: ADVANCED PRACTICE MIDWIFE

## 2017-02-15 NOTE — PROGRESS NOTES
"  Subjective:       Maria Del Rosario Vasquez is a 21 y.o. female who presents for a postpartum visit. She is 6 weeks postpartum following a low cervical transverse  section. I have fully reviewed the prenatal and intrapartum course. The delivery was at   term gestational weeks. Outcome: primary  section, low transverse incision. Anesthesia: epidural. Postpartum course has been uneventful. Baby's course has been WNL. Baby is feeding by breast. Bleeding no bleeding. Bowel function is normal. Bladder function is normal. Patient is not sexually active. Contraception method is thinking about Paragard. Postpartum depression screening: negative.    The following portions of the patient's history were reviewed and updated as appropriate: allergies, current medications, past family history, past medical history, past social history, past surgical history and problem list.    Review of Systems  A comprehensive review of systems was negative.     Objective:        Visit Vitals    /76    Ht 5' 4" (1.626 m)    LMP 2016    Breastfeeding Yes      General:  alert, appears stated age and cooperative    Breasts:  inspection negative, no nipple discharge or bleeding, no masses or nodularity palpable and lactating   Lungs: clear to auscultation bilaterally   Heart:  regular rate and rhythm, S1, S2 normal, no murmur, click, rub or gallop   Abdomen: normal findings: soft, non-tender    Vulva:  normal   Vagina: normal vagina   Cervix:  no cervical motion tenderness   Corpus: normal size, contour, position, consistency, mobility, non-tender   Adnexa:  no mass, fullness, tenderness   Rectal Exam: Not performed.          Assessment:       6 weeks  postpartum exam. Pap smear not done at today's visit.  Done 09/15 WNL    Plan:      1. Contraception: possibly Paragard  2. PP Exam normal  Thinking about Paragard  Given info  Will call if she decides to have it.  3. Follow up in: 2 weeks for IUD or as needed.   "

## 2017-04-10 PROBLEM — O42.92 FULL-TERM PREMATURE RUPTURE OF MEMBRANES: Status: RESOLVED | Noted: 2017-01-08 | Resolved: 2017-04-10

## 2018-04-24 ENCOUNTER — OFFICE VISIT (OUTPATIENT)
Dept: OBSTETRICS AND GYNECOLOGY | Facility: CLINIC | Age: 23
End: 2018-04-24
Payer: COMMERCIAL

## 2018-04-24 VITALS
DIASTOLIC BLOOD PRESSURE: 80 MMHG | HEIGHT: 64 IN | WEIGHT: 162.13 LBS | SYSTOLIC BLOOD PRESSURE: 122 MMHG | BODY MASS INDEX: 27.68 KG/M2

## 2018-04-24 DIAGNOSIS — Z34.91 PRENATAL CARE IN FIRST TRIMESTER: ICD-10-CM

## 2018-04-24 PROCEDURE — 99999 PR PBB SHADOW E&M-EST. PATIENT-LVL III: CPT | Mod: PBBFAC,,, | Performed by: ADVANCED PRACTICE MIDWIFE

## 2018-04-24 PROCEDURE — 99215 OFFICE O/P EST HI 40 MIN: CPT | Mod: S$GLB,,, | Performed by: ADVANCED PRACTICE MIDWIFE

## 2018-04-24 RX ORDER — PROGESTERONE 200 MG/1
CAPSULE ORAL
Status: ON HOLD | COMMUNITY
Start: 2018-04-17 | End: 2018-11-04 | Stop reason: HOSPADM

## 2018-04-24 NOTE — PROGRESS NOTES
Maria Del Rosario Vasquez is a 23 y.o. , presents today for pregnancy/transfer of care.     C/C:  Establish care/transfer prenatal care  Established pregnancy with Dr Kelly on [facility]. Had labs, urine, and two ultrasounds.     HPI: Reports amenorrhea since Patient's last menstrual period was 2018. for LINDY of 10/26/18 which she states the ultrasound with Dr Kelly confirmed. Episode of spotting x two days at 8w5d and 8w6d - dx with TRACIE however bleeding has since resolved.     SOCIAL HISTORY: Denies emotional/mental/physical/sexual violence or abuse. Feels safe at home. Accompanied today by her  Derek and their son Tomás. Second baby for both.     PAP HISTORY: last pap 9/15, result negative, denies any history of abnormal pap smear or STDs.     Reports no long-term chronic medical conditions     Review of patient's allergies indicates:  No Known Allergies  Past Medical History:   Diagnosis Date    Anemia     past     Past Surgical History:   Procedure Laterality Date     SECTION  2017    TONSILLECTOMY      WISDOM TOOTH EXTRACTION       Past Surgical History:   Procedure Laterality Date     SECTION  2017    TONSILLECTOMY      WISDOM TOOTH EXTRACTION       OB History    Para Term  AB Living   3 1 1 0 1 1   SAB TAB Ectopic Multiple Live Births   1 0 0 0 1      # Outcome Date GA Lbr Yanick/2nd Weight Sex Delivery Anes PTL Lv   3 Current            2 Term 17 41w0d  3.684 kg (8 lb 2 oz) M CS-LTranv EPI N BLESSING      Complications: Failure to Progress in First Stage   1 SAB 16 6w0d             Birth Comments: passed naturally        OB History      Para Term  AB Living    3 1 1 0 1 1    SAB TAB Ectopic Multiple Live Births    1 0 0 0 1        Social History     Social History    Marital status:      Spouse name: N/A    Number of children: N/A    Years of education: N/A     Occupational History    Not on file.     Social History Main Topics     Smoking status: Never Smoker    Smokeless tobacco: Never Used    Alcohol use No      Comment: not with pregnancy    Drug use: No    Sexual activity: Yes     Partners: Male     Birth control/ protection: None     Other Topics Concern    Not on file     Social History Narrative     Derek pool. 2017 via CS    Second baby for both!    Still in [facility]     Family History   Problem Relation Age of Onset    Diabetes Paternal Grandfather     Hyperlipidemia Paternal Grandfather     Heart disease Paternal Grandfather     Hypertension Paternal Grandfather     Emphysema Paternal Grandmother     Glaucoma Maternal Grandmother     Heart block Maternal Grandfather     No Known Problems Father     No Known Problems Mother     Colon cancer Neg Hx     Breast cancer Neg Hx     Ovarian cancer Neg Hx      History   Sexual Activity    Sexual activity: Yes    Partners: Male    Birth control/ protection: None       GENETIC SCREENING   Patient's age 35 years or older as of estimated date of delivery? no  Neural tube defect (meningomyelocele, spina bifida, or anencephaly)? no  Down syndrome? no  Franc-Sachs (Ashkenazi Pentecostalism, Cajun, English Cameroonian)? no  Canavan disease (Ashkenazi Pentecostalism)? no  Familial dysautonomia (Ashkenazi Pentecostalism)? no  Sickle cell disease or trait ()? no  Hemophilia or other blood disorders? no  Cystic fibrosis? no  Muscular dystrophy? no  Arapahoe's chorea? no  Thalassemia (Italian, Greek, Mediterranean, or  background) MCV less than 80? no  Congenital heart defect? no  Mental retardation/autism? no   If Yes, was person tested for Fragile X? no  Other inherited genetic or chromosomal disorder? no  Maternal metabolic disorder (e.g. type 1 diabetes, PKU)? no  Patient or baby's father had a child with birth defects not listed above? no  Recurrent pregnancy loss or a stillbirth: SAB x  1  Medications (including supplements, vitamins, herbs or OTC drugs)/illicit/recreational  "drugs/alcohol since last menstrual period? no   If yes, agent(s) and strength/dose: n/a  List any other genetic risks: no  Comments/counseling: n/a    INFECTION HISTORY  Live with someone with TB or exposed to TB: no  Patient or partner has history of genital herpes: no  Rash or viral illness since last menstrual period: no  Patient or partner has hepatitis B or C: no  History of STD, gonorrhea, chlamydia, HPV, HIV, syphilis (list all that apply): no  List other infections: no  Additional comments: n/a    Primary Doctor No     ROS:    Constitutional/Gen: Denies fevers, chills, malaise, or weight loss.   Psych: Denies depression, anxiety  Eyes: Denies changes in vision or scotomata  Ears, nose, mouth, throat: Denies sinus tenderness, swelling, or dentition problems  CV/vasc: Denies heart palpitations or edema  Resp: Denies SOB or dyspnea  Breasts: Denies mass, nipple discharge, or trauma.   GI: Denies constipation, diarrhea, or vomiting.   : Denies vaginal discharge, dysuria or pelvic pain.   MS: Denies weakness, soreness, or changes in ROM    OBJECTIVE:  /80   Ht 5' 4" (1.626 m)   Wt 73.5 kg (162 lb 2.4 oz)   LMP 2018   Breastfeeding? Yes   BMI 27.83 kg/m²   Constitutional/Gen: NAD, appears stated age, well groomed  Lung: normal resp effort  Heart: normal HR   Back: negative CVAT  Abdomen: gravid, limited bedside US to confirm SVIUP with cardiac activity and movement noted; observed by myself, Maria Del Rosario and Derek  Extremities: FROM, with no edema or tenderness.  Neurologic: A&O x 4, non-focal, cranial nerves 2-12 grossly intact  Psych: affect appropriate and without signs of mood, thought or memory difficulty appreciated    UPT + in office    ASSESSMENT:  23 y.o. female  to transfer prenatal care  Likely at 13w4d  Hx CS, desires TOLAC  Body mass index is 27.83 kg/m².  Patient Active Problem List   Diagnosis    S/P  section       PLAN:  1. Amenorrhea  -- LINDY 10/26/18.   -- Records " requested from Dr Kelly    2. Last pap 9/15 negative    3. BMI 27  -- Discussed IOM recommended weight gain of:   Weight category Pre pre BMI  Recommended TWG   Underweight Less than 18.5 28-40    Normal Weight 18.5-24.9  25-35    Overweight 25-29.9  15-25    Obese   30 and greater  11-20     4. Discussed nausea and vomiting in pregnancy  -- Education regarding lifestyle and dietary modifications  -- Reviewed use of B6/Unisom prn. Pt will notify us if no relief/worsening symptoms, will consider alternative therapies prn    5. Pregnancy education and couseling; handouts and booklet provided  -- Oriented to practice and anticipated prenatal course of care and how to contact us  -- Reviewed ABC guidelines and admission, exclusion, and transfer criteria  -- Precautions/warning signs reviewed  -- Common complaints of pregnancy  -- Routine prenatal labs including HIV  -- Ultrasounds  -- Childbirth education/hospital/[facility] facilities  -- Nutrition, prepregnant BMI, and recommended weight gain  -- Toxoplasmosis precautions (Cats/Raw Meat)  -- Sexual activity and exercise  -- Environmental/Work hazards  -- Travel  -- Tobacco (Ask, Advise, Assess, Assist, and Arrange), as well as alcohol and drug use  -- Use of any medications (Including supplements, Vitamins, Herbs, or OTC Drugs)  -- Domestic violence screen negative    6. Reviewed genetic testing options. Reviewed available first trimester and/or second  trimester screening options. Reviewed risk of false positive/negative results and recommendation of referral to [facility] in event of a positive result, for NIPT, US, and/or amniocentesis. Reviewed the possible estimated 1 in 300/500 risk of miscarriage with amniocentesis, even with a healthy fetus. Patient declines genetic screening.     7. Hx CS, desires TOLAC  -- Will bring to next JOSI/MD lechuga  -- Reviewed policies, she states understanding and agrees    8. Reviewed warning signs, precautions, and how/when to contact us.     9.  RTC x 4 weeks, call or present sooner prn.     Updated Medication List:  Current Outpatient Prescriptions   Medication Sig Dispense Refill    PNV CMB#21/IRON/FOLIC ACID (PRENATAL COMPLETE ORAL) Take by mouth.      progesterone (PROMETRIUM) 200 MG capsule        No current facility-administered medications for this visit.          Celina Rai CNM/NP  4/24/2018 1:40 PM

## 2018-04-25 PROBLEM — Z34.90 PREGNANCY, OBSTETRICAL CARE: Status: ACTIVE | Noted: 2018-04-25

## 2018-05-08 NOTE — NURSING
Received prenatal record from previous provider  Dating US report included and consistent with LMP LINDY,  lab results are as follows    Labs done 03/16/18    Blood type  O+  Antibody  Neg  H/H  13.3/38.8  MCV  88.6  Platelets  247  Rubella  Immune  RPR  Neg  Hep B  Neg  HIV  Neg  TSH  0.63  Sickle Cell  Neg  Urine culture  Neg  GC  Neg  CT  Neg    PAP  Done 06/27/17  Results: Negative for Intraepithelial Lesion or Malignancy

## 2018-05-21 ENCOUNTER — ROUTINE PRENATAL (OUTPATIENT)
Dept: OBSTETRICS AND GYNECOLOGY | Facility: CLINIC | Age: 23
End: 2018-05-21
Payer: COMMERCIAL

## 2018-05-21 VITALS — WEIGHT: 165.44 LBS | DIASTOLIC BLOOD PRESSURE: 70 MMHG | SYSTOLIC BLOOD PRESSURE: 110 MMHG | BODY MASS INDEX: 28.4 KG/M2

## 2018-05-21 DIAGNOSIS — Z34.92 PRENATAL CARE IN SECOND TRIMESTER: Primary | ICD-10-CM

## 2018-05-21 PROCEDURE — 0500F INITIAL PRENATAL CARE VISIT: CPT | Mod: S$GLB,,, | Performed by: ADVANCED PRACTICE MIDWIFE

## 2018-05-21 PROCEDURE — 99999 PR PBB SHADOW E&M-EST. PATIENT-LVL II: CPT | Mod: PBBFAC,,, | Performed by: ADVANCED PRACTICE MIDWIFE

## 2018-05-21 NOTE — PROGRESS NOTES
23 y.o. female  at 17w3d  With  and son today  Starting to feel flutters/FM, denies VB, LOF or cramping  Unable to auscultate FHTs with doppler -- limited bedside US today - SVIUP, FM and cardiac activity visualized by myself and Vimal Rodriguez CS, desires TOLAC   -- Will bring to next CNM/MD mtg  Reviewed prenatal labs  Genetic testing continues to be declined  Anatomy US ordered  Reviewed warning signs, normal FM, pregnancy precautions and how/when to call.  RTC x 4 wks, call or present sooner prn.   Birth Center Risk Assessment: 1  0- CNM management in ABC  1- CNM management on L&D  2- Consultation with OB to develop plan of care  3- Collaborative CNM/OB management with delivery on L&D  4- Referral or transfer of care to MD

## 2018-05-24 ENCOUNTER — TELEPHONE (OUTPATIENT)
Dept: OBSTETRICS AND GYNECOLOGY | Facility: CLINIC | Age: 23
End: 2018-05-24

## 2018-05-24 RX ORDER — TERCONAZOLE 8 MG/G
1 CREAM VAGINAL NIGHTLY
Qty: 20 G | Refills: 1 | Status: ON HOLD | OUTPATIENT
Start: 2018-05-24 | End: 2018-11-04 | Stop reason: HOSPADM

## 2018-05-24 NOTE — TELEPHONE ENCOUNTER
Spoke with Maria Del Rosario about yeast infection  Rx terazol provided    Celina Rai CNM/NP  Certified Nurse Midwife/Nurse Practitioner  5/24/2018 2:00 PM

## 2018-05-30 ENCOUNTER — TELEPHONE (OUTPATIENT)
Dept: OBSTETRICS AND GYNECOLOGY | Facility: HOSPITAL | Age: 23
End: 2018-05-30

## 2018-05-30 NOTE — TELEPHONE ENCOUNTER
Pt reports found small tick embedded in nipple this morning and removed it, reports intact - she disposed of it immediately, unsure which type of tick.  Reports she did not notice it earlier this morning with breast feeding her toddler. Denies fever, chills, or malaise. Discussed warning s/s to be aware of and advised her to call her PCP to determine if further follow up needed.  Pt denies any pregnancy concerns or complaints - reports doing well overall.    ----- Message from Jesika Mcclendon sent at 5/30/2018 10:12 AM CDT -----  Contact: [phone]  Pt called because she has a tick bite on her nipple and is worried. Please advise.

## 2018-06-04 ENCOUNTER — OFFICE VISIT (OUTPATIENT)
Dept: MATERNAL FETAL MEDICINE | Facility: CLINIC | Age: 23
End: 2018-06-04
Attending: OBSTETRICS & GYNECOLOGY
Payer: COMMERCIAL

## 2018-06-04 DIAGNOSIS — O44.20 MARGINAL PLACENTA PREVIA: ICD-10-CM

## 2018-06-04 DIAGNOSIS — Z34.91 PRENATAL CARE IN FIRST TRIMESTER: ICD-10-CM

## 2018-06-04 PROCEDURE — 99203 OFFICE O/P NEW LOW 30 MIN: CPT | Mod: 25,S$GLB,, | Performed by: OBSTETRICS & GYNECOLOGY

## 2018-06-04 PROCEDURE — 76811 OB US DETAILED SNGL FETUS: CPT | Mod: S$GLB,,, | Performed by: OBSTETRICS & GYNECOLOGY

## 2018-06-04 NOTE — LETTER
June 4, 2018      Celina Billy CNM  [address] [place] [address]           [facility]  [address] [facility] [address] [facility] [place] [address]  Phone: [phone]          Patient: Maria Del Rosario Vasquez   MR Number: [id number]   YOB: 1995   Date of Visit: 6/4/2018       Dear Celina Billy:    Thank you for referring Maria Del Rosario Vasquez to me for evaluation. Attached you will find relevant portions of my assessment and plan of care.    If you have questions, please do not hesitate to call me. I look forward to following Maria Del Rosario Vasquez along with you.    Sincerely,    Sagar Alas MD    Enclosure  CC:  No Recipients    If you would like to receive this communication electronically, please contact [email or web address] or [phone] to request more information on [employer] Link access.    For providers and/or their staff who would like to refer a patient to [facility], please contact us through our one-stop-shop provider referral line, [facility] , at [phone].    If you feel you have received this communication in error or would no longer like to receive these types of communications, please e-mail [email or web address]

## 2018-06-04 NOTE — PROGRESS NOTES
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 [id number] 19w3d EGA .    PMH:  Past Medical History:   Diagnosis Date    Anemia     past       PObHx:  OB History    Para Term  AB Living   3 1 1 0 1 1   SAB TAB Ectopic Multiple Live Births   1 0 0 0 1      # Outcome Date GA Lbr Yanick/2nd Weight Sex Delivery Anes PTL Lv   3 Current            2 Term 17 41w0d  3.684 kg (8 lb 2 oz) M CS-LTranv EPI N BLESSING      Complications: Failure to Progress in First Stage   1 SAB 16 6w0d             Birth Comments: passed naturally          PSH:  Past Surgical History:   Procedure Laterality Date     SECTION  2017    TONSILLECTOMY      WISDOM TOOTH EXTRACTION         Family history:family history includes Diabetes in her paternal grandfather; Emphysema in her paternal grandmother; Glaucoma in her maternal grandmother; Heart block in her maternal grandfather; Heart disease in her paternal grandfather; Hyperlipidemia in her paternal grandfather; Hypertension in her paternal grandfather; No Known Problems in her father and mother.    Social history: reports that she has never smoked. She has never used smokeless tobacco. She reports that she does not drink alcohol or use drugs.    A detailed fetal anatomical ultrasound was completed today.  See details in imaging section of EPIC.    On ultrasound today the presence of a marginal previa was seen.  I discussed with patient the bleeding risks associated with placenta previa.  I also reviewed the normal growth of the uterus and the high likelihood that the placental edge will migrate away from the cervical os.  After today's visit I would like to reinspect placental location at 32-36 weeks EGA to confirm it has moved from the cervical os.  Bleeding precautions and pelvic rest reviewed with patient today.     On today's US a single kidney was seen and only 1 renal artery was seen.     Unilateral RA accounts for 5 percent of renal malformations. When a solitary kidney is detected, it is usually an incidental finding as a result of an ultrasound performed antenatally or as part of an evaluation for a urinary tract infection. Although the majority of patients are asymptomatic, unilateral RA can be accompanied by other  and nonrenal anomalies, and evidence of renal injury.  There is no evidence of this on US.    This was illustrated in a systemic review of the literature that included 43 studies that reported an estimated incidence of solitary kidney of 0.05 percent. Associated  anomalies were observed in about one-third of patients with solitary kidneys, of which vesicoureteral reflux was the most common finding (24 percent of patients). Extra-renal malformations were also found in about one-third of patients. In this review, findings associated with unilateral RA suggestive of renal injury included micro-albuminuria (21 percent), hypertension (16 percent), and reduced renal function defined as an estimated glomerular filtration rate (GFR) less than 60 mL/min/1.73 m2 (10 percent).  Presumed renal agenesis also may actually be an ectopic dysplastic renal nubbin associated with Müllerian abnormalities such as uterus didelphys.    The patient was given an opportunity to ask questions about management and the diease process.  She expressed an understanding of and agreement to the above impression and plan. All questions were answered to her satisfaction.  She was given contact information to the [facility] clinic to address further concerns.      The approximate physician face-to-face time was 30minutes. The majority of the time (>50%) was spent on counseling of the patient or coordination of care.3

## 2018-06-05 ENCOUNTER — TELEPHONE (OUTPATIENT)
Dept: OBSTETRICS AND GYNECOLOGY | Facility: CLINIC | Age: 23
End: 2018-06-05

## 2018-06-05 NOTE — TELEPHONE ENCOUNTER
Left voicemail  Was going to discuss anatomy US and marginal previa and recommended f/u US    Celina Rai CNM/NP  Certified Nurse Midwife/Nurse Practitioner  6/5/2018 2:44 PM

## 2018-06-06 ENCOUNTER — DOCUMENTATION ONLY (OUTPATIENT)
Dept: OBSTETRICS AND GYNECOLOGY | Facility: CLINIC | Age: 23
End: 2018-06-06

## 2018-06-06 DIAGNOSIS — Z98.891 S/P CESAREAN SECTION: ICD-10-CM

## 2018-06-06 NOTE — PROGRESS NOTES
Per JOSI/MD mtg with [facility] 5/31/18:  -- Cleared for TOLAC    Celina Rai CNM/NP  Certified Nurse Midwife/Nurse Practitioner  6/6/2018 10:46 AM

## 2018-06-18 ENCOUNTER — ROUTINE PRENATAL (OUTPATIENT)
Dept: OBSTETRICS AND GYNECOLOGY | Facility: CLINIC | Age: 23
End: 2018-06-18
Payer: COMMERCIAL

## 2018-06-18 VITALS — BODY MASS INDEX: 28.67 KG/M2 | WEIGHT: 167 LBS | DIASTOLIC BLOOD PRESSURE: 62 MMHG | SYSTOLIC BLOOD PRESSURE: 110 MMHG

## 2018-06-18 DIAGNOSIS — Z34.92 PRENATAL CARE IN SECOND TRIMESTER: Primary | ICD-10-CM

## 2018-06-18 PROCEDURE — 0502F SUBSEQUENT PRENATAL CARE: CPT | Mod: S$GLB,,, | Performed by: ADVANCED PRACTICE MIDWIFE

## 2018-06-18 PROCEDURE — 99999 PR PBB SHADOW E&M-EST. PATIENT-LVL II: CPT | Mod: PBBFAC,,, | Performed by: ADVANCED PRACTICE MIDWIFE

## 2018-06-18 NOTE — PROGRESS NOTES
23 y.o. female  at 21w3d  With Forrest and their son today  Starting to feel flutters/FM, denies VB, LOF or cramping  Marginal previa. Aware, f/u US already scheduled.  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  RTC x 4 wks, call or present sooner prn.     Birth Center Risk Assessment: 1 (hx CS), pending placental location  0- CNM management in ABC  1- CNM management on L&D  2- Consultation with OB to develop plan of care  3- Collaborative CNM/OB management with delivery on L&D  4- Referral or transfer of care to MD

## 2018-06-21 ENCOUNTER — TELEPHONE (OUTPATIENT)
Dept: OBSTETRICS AND GYNECOLOGY | Facility: CLINIC | Age: 23
End: 2018-06-21

## 2018-06-21 NOTE — TELEPHONE ENCOUNTER
Maria Del Rosario called me back  Reports no further spotting  Feeling normal FM  No cramping or pain  No LOF    Warning signs reviewed    Celina Rai CNM/NP  Certified Nurse Midwife/Nurse Practitioner  6/21/2018 9:26 AM

## 2018-06-21 NOTE — TELEPHONE ENCOUNTER
Spoke with Maria Del Rosario  One episode of bright red spotting this morning (10 minutes ago) when wiping  Then stopped   No further since  Feeling fetal movement  No cramping or pain or LOF  Known marginal keya  Asked that she rest and monitor bleeding/FM/discomfort over the next ~ 30 minutes and call me back     Celina Rai, LAURYM/NP  Certified Nurse Midwife/Nurse Practitioner  6/21/2018 8:26 AM        ----- Message from Sharon Sunshine sent at 6/21/2018  8:19 AM CDT -----            Name of Who is Calling: maria del rosario      What is the request in detail: pt. Stating she's experiencing bright red spotting. Please call to discuss      Can the clinic reply by [facility]: no      What Number to Call Back if not in [facility]: [phone]

## 2018-07-19 ENCOUNTER — LAB VISIT (OUTPATIENT)
Dept: LAB | Facility: OTHER | Age: 23
End: 2018-07-19
Payer: COMMERCIAL

## 2018-07-19 ENCOUNTER — ROUTINE PRENATAL (OUTPATIENT)
Dept: OBSTETRICS AND GYNECOLOGY | Facility: CLINIC | Age: 23
End: 2018-07-19
Payer: COMMERCIAL

## 2018-07-19 VITALS — SYSTOLIC BLOOD PRESSURE: 112 MMHG | WEIGHT: 172 LBS | BODY MASS INDEX: 29.52 KG/M2 | DIASTOLIC BLOOD PRESSURE: 60 MMHG

## 2018-07-19 DIAGNOSIS — O23.592 VAGINITIS AFFECTING PREGNANCY IN SECOND TRIMESTER, ANTEPARTUM: Primary | ICD-10-CM

## 2018-07-19 DIAGNOSIS — O44.20 MARGINAL PLACENTA PREVIA: ICD-10-CM

## 2018-07-19 DIAGNOSIS — Z34.92 PREGNANT AND NOT YET DELIVERED IN SECOND TRIMESTER: ICD-10-CM

## 2018-07-19 DIAGNOSIS — Z34.92 PRENATAL CARE IN SECOND TRIMESTER: ICD-10-CM

## 2018-07-19 LAB
BASOPHILS # BLD AUTO: 0.01 K/UL
BASOPHILS NFR BLD: 0.1 %
DIFFERENTIAL METHOD: ABNORMAL
EOSINOPHIL # BLD AUTO: 0.1 K/UL
EOSINOPHIL NFR BLD: 0.7 %
ERYTHROCYTE [DISTWIDTH] IN BLOOD BY AUTOMATED COUNT: 13.1 %
GLUCOSE SERPL-MCNC: 80 MG/DL
HCT VFR BLD AUTO: 38.4 %
HGB BLD-MCNC: 12.7 G/DL
LYMPHOCYTES # BLD AUTO: 1.2 K/UL
LYMPHOCYTES NFR BLD: 13.6 %
MCH RBC QN AUTO: 30.2 PG
MCHC RBC AUTO-ENTMCNC: 33.1 G/DL
MCV RBC AUTO: 91 FL
MONOCYTES # BLD AUTO: 0.7 K/UL
MONOCYTES NFR BLD: 7.7 %
NEUTROPHILS # BLD AUTO: 6.8 K/UL
NEUTROPHILS NFR BLD: 77.3 %
PLATELET # BLD AUTO: 222 K/UL
PMV BLD AUTO: 10 FL
RBC # BLD AUTO: 4.21 M/UL
WBC # BLD AUTO: 8.81 K/UL

## 2018-07-19 PROCEDURE — 0502F SUBSEQUENT PRENATAL CARE: CPT | Mod: S$GLB,,, | Performed by: ADVANCED PRACTICE MIDWIFE

## 2018-07-19 PROCEDURE — 99999 PR PBB SHADOW E&M-EST. PATIENT-LVL II: CPT | Mod: PBBFAC,,, | Performed by: ADVANCED PRACTICE MIDWIFE

## 2018-07-19 PROCEDURE — 87480 CANDIDA DNA DIR PROBE: CPT

## 2018-07-19 PROCEDURE — 82950 GLUCOSE TEST: CPT

## 2018-07-19 PROCEDURE — 87510 GARDNER VAG DNA DIR PROBE: CPT

## 2018-07-19 PROCEDURE — 85025 COMPLETE CBC W/AUTO DIFF WBC: CPT

## 2018-07-19 PROCEDURE — 36415 COLL VENOUS BLD VENIPUNCTURE: CPT

## 2018-07-21 LAB
CANDIDA RRNA VAG QL PROBE: POSITIVE
G VAGINALIS RRNA GENITAL QL PROBE: POSITIVE
T VAGINALIS RRNA GENITAL QL PROBE: NEGATIVE

## 2018-07-23 ENCOUNTER — TELEPHONE (OUTPATIENT)
Dept: OBSTETRICS AND GYNECOLOGY | Facility: CLINIC | Age: 23
End: 2018-07-23

## 2018-07-23 DIAGNOSIS — N76.0 BACTERIAL VAGINOSIS: Primary | ICD-10-CM

## 2018-07-23 DIAGNOSIS — B96.89 BACTERIAL VAGINOSIS: Primary | ICD-10-CM

## 2018-07-23 DIAGNOSIS — B37.9 CANDIDA INFECTION: ICD-10-CM

## 2018-07-23 RX ORDER — CLOTRIMAZOLE 1 %
CREAM (GRAM) TOPICAL
Qty: 30 G | Refills: 1 | Status: ON HOLD | OUTPATIENT
Start: 2018-07-23 | End: 2018-11-04 | Stop reason: HOSPADM

## 2018-07-23 RX ORDER — METRONIDAZOLE 500 MG/1
500 TABLET ORAL EVERY 12 HOURS
Qty: 14 TABLET | Refills: 0 | Status: SHIPPED | OUTPATIENT
Start: 2018-07-23 | End: 2018-07-30

## 2018-07-23 NOTE — TELEPHONE ENCOUNTER
Attempted pt phone call x 4 - doesn't ring. Will send pt portal message regarding results of Affirm - prescription sent to pharmacy.     Zhanna Flores CNM  7/23/2018 @ 8812

## 2018-07-30 ENCOUNTER — OFFICE VISIT (OUTPATIENT)
Dept: MATERNAL FETAL MEDICINE | Facility: CLINIC | Age: 23
End: 2018-07-30
Attending: OBSTETRICS & GYNECOLOGY
Payer: COMMERCIAL

## 2018-07-30 ENCOUNTER — TELEPHONE (OUTPATIENT)
Dept: OBSTETRICS AND GYNECOLOGY | Facility: CLINIC | Age: 23
End: 2018-07-30

## 2018-07-30 DIAGNOSIS — O44.20 MARGINAL PLACENTA PREVIA: ICD-10-CM

## 2018-07-30 PROCEDURE — 76817 TRANSVAGINAL US OBSTETRIC: CPT | Mod: S$GLB,,, | Performed by: OBSTETRICS & GYNECOLOGY

## 2018-07-30 PROCEDURE — 76816 OB US FOLLOW-UP PER FETUS: CPT | Mod: S$GLB,,, | Performed by: OBSTETRICS & GYNECOLOGY

## 2018-07-30 PROCEDURE — 99499 UNLISTED E&M SERVICE: CPT | Mod: S$GLB,,, | Performed by: OBSTETRICS & GYNECOLOGY

## 2018-07-30 NOTE — TELEPHONE ENCOUNTER
"Pt currently taking Metronidazole prescription, as prescribed.  Reports has had spotting approx 1x/wk since approx 20wks.  Reports occurred twice over the last week - on Thursday and then Saturday (2d ago) - reports was brighter red, but scant amount ("only like 1-2 drops on my panty liner" and she noted with wiping following void oin Sat evening) and has now resolved. Reports now orangish/brownish discharge, although no abnormal odor or additional changes.  Denies contractions, cramping, or abdominal pain. Reports excellent fetal movement.  No additional complaints or concerns. Reports pelvic rest and no sex or any foreign objects in vagina since approx 20wks with dx of previa. Has ultrasound with [facility] today.   Reassurance provided, advised she finish Flagyl rx as prescribed.  Strict bleeding/previa precautions and warning s/s reviewed. Pt will call/come in if recurrent    ----- Message from Radha Verma MA sent at 7/30/2018  8:30 AM CDT -----  Regarding: Patient concerns  Patient states last Thursday she had light spotting (couple spots within 12 hrs). More spotting this Saturday a little bright red. Patient states she Is now having brownish spotting. She is currently on antibiotics, which she thinks may be causing the spotting. She she continue antibiotics? Patient has an appointment with [facility] today, please call to advise.      Thanks!  Van    "

## 2018-07-30 NOTE — LETTER
July 30, 2018      Zhanna Flores, CNALYSSA  [address]  [address]  [place] [address]           [facility]  [address]  [place] [address]  Phone: [phone]          Patient: Maria Del Rosario Vasquez   MR Number: [id number]   YOB: 1995   Date of Visit: 7/30/2018       Dear Zhanna Flores:    Thank you for referring Maria Del Rosario Vasquez to me for evaluation. Attached you will find relevant portions of my assessment and plan of care.    If you have questions, please do not hesitate to call me. I look forward to following Maria Del Rosario Vasquez along with you.    Sincerely,    Sagar Alas MD    Enclosure  CC:  No Recipients    If you would like to receive this communication electronically, please contact [email or web address] or [phone] to request more information on [facility] [employer] access.    For providers and/or their staff who would like to refer a patient to [facility], please contact us through our one-stop-shop provider referral line, [facility] , at [phone].    If you feel you have received this communication in error or would no longer like to receive these types of communications, please e-mail [email or web address]

## 2018-08-03 ENCOUNTER — PATIENT MESSAGE (OUTPATIENT)
Dept: OBSTETRICS AND GYNECOLOGY | Facility: CLINIC | Age: 23
End: 2018-08-03

## 2018-08-03 ENCOUNTER — TELEPHONE (OUTPATIENT)
Dept: OBSTETRICS AND GYNECOLOGY | Facility: HOSPITAL | Age: 23
End: 2018-08-03

## 2018-08-03 ENCOUNTER — HOSPITAL ENCOUNTER (EMERGENCY)
Facility: OTHER | Age: 23
Discharge: HOME | End: 2018-08-03
Attending: OBSTETRICS & GYNECOLOGY
Payer: COMMERCIAL

## 2018-08-03 VITALS
HEART RATE: 104 BPM | RESPIRATION RATE: 16 BRPM | OXYGEN SATURATION: 96 % | SYSTOLIC BLOOD PRESSURE: 121 MMHG | DIASTOLIC BLOOD PRESSURE: 68 MMHG | TEMPERATURE: 98 F

## 2018-08-03 DIAGNOSIS — B37.31 VAGINAL YEAST INFECTION: ICD-10-CM

## 2018-08-03 DIAGNOSIS — N93.9 VAGINAL SPOTTING: Primary | ICD-10-CM

## 2018-08-03 DIAGNOSIS — Z3A.28 28 WEEKS GESTATION OF PREGNANCY: ICD-10-CM

## 2018-08-03 PROCEDURE — 99284 EMERGENCY DEPT VISIT MOD MDM: CPT | Mod: 25,,, | Performed by: OBSTETRICS & GYNECOLOGY

## 2018-08-03 PROCEDURE — 59025 FETAL NON-STRESS TEST: CPT | Mod: 26,,, | Performed by: OBSTETRICS & GYNECOLOGY

## 2018-08-03 PROCEDURE — 99284 EMERGENCY DEPT VISIT MOD MDM: CPT

## 2018-08-03 PROCEDURE — 87660 TRICHOMONAS VAGIN DIR PROBE: CPT

## 2018-08-03 RX ORDER — FLUCONAZOLE 150 MG/1
150 TABLET ORAL ONCE
Qty: 1 TABLET | Refills: 0 | Status: SHIPPED | OUTPATIENT
Start: 2018-08-03 | End: 2018-08-03

## 2018-08-03 NOTE — ED PROVIDER NOTES
Encounter Date: 8/3/2018       History     Chief Complaint   Patient presents with    Vaginal Bleeding     Maria Del Rosario Vasquez is a 23 y.o. [id number] at 28w0d presents complaining of vaginal spotting. Noticed spots on toilet paper yesterday and today. Also complains of white discharge. Hx of yeast infections prior to pregnancy. Recently treated for BV as well.  This IUP is complicated by h/o , marginal placenta previa (now resolved).  Patient denies contractions, reports vaginal spotting, denies LOF.   Fetal Movement: normal.            Review of patient's allergies indicates:  No Known Allergies  Past Medical History:   Diagnosis Date    Anemia     past     Past Surgical History:   Procedure Laterality Date     SECTION  2017    TONSILLECTOMY      WISDOM TOOTH EXTRACTION       Family History   Problem Relation Age of Onset    Diabetes Paternal Grandfather     Hyperlipidemia Paternal Grandfather     Heart disease Paternal Grandfather     Hypertension Paternal Grandfather     Emphysema Paternal Grandmother     Glaucoma Maternal Grandmother     Heart block Maternal Grandfather     No Known Problems Father     No Known Problems Mother     Colon cancer Neg Hx     Breast cancer Neg Hx     Ovarian cancer Neg Hx      Social History   Substance Use Topics    Smoking status: Never Smoker    Smokeless tobacco: Never Used    Alcohol use No      Comment: not with pregnancy     Review of Systems   Constitutional: Negative for activity change, appetite change, chills and fever.   Eyes: Negative for photophobia and visual disturbance.   Respiratory: Negative for cough and shortness of breath.    Cardiovascular: Negative for chest pain and leg swelling.   Gastrointestinal: Negative for constipation, diarrhea, nausea and vomiting.   Genitourinary: Positive for vaginal bleeding and vaginal discharge. Negative for vaginal pain.   Neurological: Negative for dizziness, light-headedness and headaches.        Physical Exam     Initial Vitals [18 1517]   BP Pulse Resp Temp SpO2   121/68 104 16 97.9 °F (36.6 °C) 96 %      MAP       --         Physical Exam    Constitutional: She appears well-developed and well-nourished. She is not diaphoretic. No distress.   HENT:   Head: Normocephalic and atraumatic.   Eyes: Conjunctivae and EOM are normal.   Neck: Normal range of motion. Neck supple.   Cardiovascular: Normal rate, regular rhythm, normal heart sounds and intact distal pulses.   Pulmonary/Chest: Breath sounds normal.   Abdominal: Soft. There is no tenderness. There is no rebound and no guarding.   Gravid, fundus NT   Neurological: She is alert and oriented to person, place, and time.   Skin: Skin is warm and dry.   Psychiatric: She has a normal mood and affect. Her behavior is normal. Judgment and thought content normal.     OB LABOR EXAM:       Method: Sterile speculum exam per MD.                 Comments: Small amount of bright red bleeding on exterior of cervical os, nabothian cyst present at 6 oclock, cervix closed, no bleeding from inside of os; thick, white, clumpy discharge present       ED Course   Procedures  Labs Reviewed   VAGINOSIS SCREEN BY DNA PROBE          Imaging Results    None          Medical Decision Making:   Initial Assessment:   Pt is a 22 y/o  who was seen for vaginal spotting  ED Management:  - bleeding from exterior of cervix; cervix closed, no bleeding from inside of cervix  - clumpy, white discharge present; pt with history of yeast infections  - fluconazole 150mg once  - collected affirm              Attending Attestation:   Physician Attestation Statement for Resident:  As the supervising MD   Physician Attestation Statement: I have personally seen and examined this patient.   I agree with the above history. -:   As the supervising MD I agree with the above PE.    As the supervising MD I agree with the above treatment, course, plan, and disposition.  I was personally  present during the critical portions of the procedure(s) performed by the resident and was immediately available in the ED to provide services and assistance as needed during the entire procedure.  I have reviewed and agree with the residents interpretation of the following: rhythm strips.  I have reviewed the following: old records at this facility.                       Clinical Impression:   The primary encounter diagnosis was Vaginal spotting. A diagnosis of 28 weeks gestation of pregnancy was also pertinent to this visit.      Disposition:   Disposition: Discharged  Condition: Stable           Mary Man MD   [facility], PGY-1                  Brenda Childers,   08/05/18 1445

## 2018-08-03 NOTE — DISCHARGE INSTRUCTIONS
Call clinic [phone] or L & D after hours at [phone] for vaginal bleeding, leakage of fluids, regular contractions every 5 mins for 2 hours, decreased fetal movements ( 10 kicks in 2 hours), headache not relieved by Tylenol, blurry vision, or temp of 100.4 or greater.  Begin doing fetal kick counts, at least 10 movements in 2 hours starting at 28 weeks gestation.  Keep next clinic appointment

## 2018-08-03 NOTE — TELEPHONE ENCOUNTER
"Called pt to discuss message she left within the hour regarding vaginal spotting and "tightness". She states she had a little spotting on Monday following TVUS that was bright red and then brown the following day. Previa resolved per sono Monday and no evidence of subchorionic hemorrhage.  Pt is rh positive. She states that last night she had spotting again that was enough "to need to wipe an extra time" but did not stain underwear. She has had spotting with each visit to restroom this morning that is light and bright red. She reports + FM and states she has had lower back pain and right sided abdominal pain for a few days. She states the pain doesn't seem to come and go like cramping or contraction like pain but it is present more often at night and in the morning. It decreases with activity during day. Advised pt that she could come to OB ED to be evaluated at this time if desired. Pt states she would like to be evaluated.     Zhanna Flores CNM   8/3/2018 @ 0900  "

## 2018-08-10 ENCOUNTER — TELEPHONE (OUTPATIENT)
Dept: OBSTETRICS AND GYNECOLOGY | Facility: CLINIC | Age: 23
End: 2018-08-10

## 2018-08-10 ENCOUNTER — PATIENT MESSAGE (OUTPATIENT)
Dept: OBSTETRICS AND GYNECOLOGY | Facility: CLINIC | Age: 23
End: 2018-08-10

## 2018-08-10 NOTE — TELEPHONE ENCOUNTER
Pt states all of her symptoms went away with tx for yeast infection. She denies current symptoms of vaginitis or abnormal discharge. Advised her that the evidence is mixed on treating asymptomatic BV in pregnancy, and she would like to avoid antibiotics if not completely necessary at this time. Reviewed lifestyle modifications and increasing probiotics and immune boosting helps. Pt v/u and will notify us if she does develop any symptoms.     Zhanna Flores CNM

## 2018-09-05 ENCOUNTER — ROUTINE PRENATAL (OUTPATIENT)
Dept: OBSTETRICS AND GYNECOLOGY | Facility: CLINIC | Age: 23
End: 2018-09-05
Payer: COMMERCIAL

## 2018-09-05 VITALS — SYSTOLIC BLOOD PRESSURE: 122 MMHG | WEIGHT: 178 LBS | DIASTOLIC BLOOD PRESSURE: 82 MMHG | BODY MASS INDEX: 30.56 KG/M2

## 2018-09-05 DIAGNOSIS — Z34.93 PRENATAL CARE IN THIRD TRIMESTER: Primary | ICD-10-CM

## 2018-09-05 PROCEDURE — 0502F SUBSEQUENT PRENATAL CARE: CPT | Mod: S$GLB,,, | Performed by: ADVANCED PRACTICE MIDWIFE

## 2018-09-05 PROCEDURE — 99999 PR PBB SHADOW E&M-EST. PATIENT-LVL III: CPT | Mod: PBBFAC,,, | Performed by: ADVANCED PRACTICE MIDWIFE

## 2018-09-06 NOTE — PROGRESS NOTES
23 y.o. female  at 32w5d  With Forrest and son Tomás today  Reports + FM, denies VB, LOF or CTX  Hx CS  -- Continues to desire TOLAC, agreeable to policies  Marginal previa   -- Resolved!  Left renal agenesis  -- Confirmed on last scan; plan to notify peds  TWG: 15 lbs   Reviewed 28wk lab results (O POS)  Declines Tdap  Reviewed upcoming 36wk labs   Reviewed warning signs, normal FKCs,  labor precautions and how/when to call.  RTC x 2 wks, call or present sooner prn.     Birth Center Risk Assessment: 1  0- CNM management in ABC  1- CNM management on L&D  2- Consultation with OB to develop plan of care  3- Collaborative CNM/OB management with delivery on L&D  4- Referral or transfer of care to MD

## 2018-09-17 ENCOUNTER — ROUTINE PRENATAL (OUTPATIENT)
Dept: OBSTETRICS AND GYNECOLOGY | Facility: CLINIC | Age: 23
End: 2018-09-17
Payer: COMMERCIAL

## 2018-09-17 VITALS — SYSTOLIC BLOOD PRESSURE: 131 MMHG | BODY MASS INDEX: 31.41 KG/M2 | DIASTOLIC BLOOD PRESSURE: 60 MMHG | WEIGHT: 183 LBS

## 2018-09-17 DIAGNOSIS — Z34.93 PRENATAL CARE IN THIRD TRIMESTER: Primary | ICD-10-CM

## 2018-09-17 PROCEDURE — 99999 PR PBB SHADOW E&M-EST. PATIENT-LVL III: CPT | Mod: PBBFAC,,, | Performed by: ADVANCED PRACTICE MIDWIFE

## 2018-09-17 PROCEDURE — 0502F SUBSEQUENT PRENATAL CARE: CPT | Mod: S$GLB,,, | Performed by: ADVANCED PRACTICE MIDWIFE

## 2018-09-18 NOTE — PROGRESS NOTES
23 y.o. female  at 34w3d   With Francesco today  Reports + FM, denies VB, LOF or CTX  Doing well without OB complaints  Hx CS  -- Desires TOLAC, cleared, agrees to policies  Marginal previa   -- Resolved!  Left renal agenesis  -- Confirmed on last scan; plan to notify peds  TW lbs   Reviewed upcoming 36wk labs   Reviewed warning signs, normal FKCs,  labor precautions and how/when to call.  RTC x 1-2 wks, call or present sooner prn.     Birth Center Risk Assessment: 1  0- CNM management in ABC  1- CNM management on L&D  2- Consultation with OB to develop plan of care  3- Collaborative CNM/OB management with delivery on L&D  4- Referral or transfer of care to MD

## 2018-10-01 ENCOUNTER — ROUTINE PRENATAL (OUTPATIENT)
Dept: OBSTETRICS AND GYNECOLOGY | Facility: CLINIC | Age: 23
End: 2018-10-01
Payer: COMMERCIAL

## 2018-10-01 ENCOUNTER — LAB VISIT (OUTPATIENT)
Dept: LAB | Facility: OTHER | Age: 23
End: 2018-10-01
Payer: COMMERCIAL

## 2018-10-01 VITALS — WEIGHT: 179.44 LBS | BODY MASS INDEX: 30.8 KG/M2 | SYSTOLIC BLOOD PRESSURE: 120 MMHG | DIASTOLIC BLOOD PRESSURE: 70 MMHG

## 2018-10-01 DIAGNOSIS — Z34.93 PRENATAL CARE IN THIRD TRIMESTER: ICD-10-CM

## 2018-10-01 DIAGNOSIS — Z34.93 PRENATAL CARE IN THIRD TRIMESTER: Primary | ICD-10-CM

## 2018-10-01 PROCEDURE — 36415 COLL VENOUS BLD VENIPUNCTURE: CPT

## 2018-10-01 PROCEDURE — 86703 HIV-1/HIV-2 1 RESULT ANTBDY: CPT

## 2018-10-01 PROCEDURE — 0502F SUBSEQUENT PRENATAL CARE: CPT | Mod: S$GLB,,, | Performed by: ADVANCED PRACTICE MIDWIFE

## 2018-10-01 PROCEDURE — 99999 PR PBB SHADOW E&M-EST. PATIENT-LVL III: CPT | Mod: PBBFAC,,, | Performed by: ADVANCED PRACTICE MIDWIFE

## 2018-10-01 PROCEDURE — 87081 CULTURE SCREEN ONLY: CPT

## 2018-10-01 PROCEDURE — 86592 SYPHILIS TEST NON-TREP QUAL: CPT

## 2018-10-01 NOTE — PROGRESS NOTES
23 y.o. female  at 36w3d  With Francesco today  Also seen with Shonda Epstein, CNM  Reports + FM, denies VB, LOF or regular CTX  Hx CS, desires TOLAC, cleared, reviewed policies  Marginal previa.Resolved!  Left renal agenesis  -- Confirmed on last scan; plan to notify peds  GBS collected today   Reviewed [place] department of [facility] recommendation for repeat HIV/RPR today; she will obtain today  Reviewed warning signs, normal FKCs, labor precautions and how/when to call.  RTC x 1 wks, call or present sooner prn.   Birth Center Risk Assessment: 1  0- CNM management in ABC  1- CNM management on L&D  2- Consultation with OB to develop plan of care  3- Collaborative CNM/OB management with delivery on L&D  4- Referral or transfer of care to MD

## 2018-10-02 LAB
HIV 1+2 AB+HIV1 P24 AG SERPL QL IA: NEGATIVE
RPR SER QL: NORMAL

## 2018-10-04 LAB — BACTERIA SPEC AEROBE CULT: NORMAL

## 2018-10-08 ENCOUNTER — ROUTINE PRENATAL (OUTPATIENT)
Dept: OBSTETRICS AND GYNECOLOGY | Facility: CLINIC | Age: 23
End: 2018-10-08
Payer: COMMERCIAL

## 2018-10-08 VITALS — SYSTOLIC BLOOD PRESSURE: 126 MMHG | DIASTOLIC BLOOD PRESSURE: 74 MMHG | BODY MASS INDEX: 30.9 KG/M2 | WEIGHT: 180 LBS

## 2018-10-08 DIAGNOSIS — Z34.93 PRENATAL CARE IN THIRD TRIMESTER: Primary | ICD-10-CM

## 2018-10-08 PROCEDURE — 0502F SUBSEQUENT PRENATAL CARE: CPT | Mod: S$GLB,,, | Performed by: ADVANCED PRACTICE MIDWIFE

## 2018-10-08 PROCEDURE — 99999 PR PBB SHADOW E&M-EST. PATIENT-LVL II: CPT | Mod: PBBFAC,,, | Performed by: ADVANCED PRACTICE MIDWIFE

## 2018-10-08 NOTE — PROGRESS NOTES
23 y.o. female  at 37w3d by   Hx CS, desires TOLAC, cleared, reviewed policies  Marginal previa.Resolved!  Left renal agenesis  -- Confirmed on last scan; plan to notify pedsHx CS, desires TOLAC, cleared, reviewed policies  Marginal previa.Resolved!  Left renal agenesis  -- Confirmed on last scan; plan to notify peds  Reports + FM, denies VB, LOF or regular CTX  Doing well without concerns  TW lbs   Delivery consents already signed  Reviewed GBS negative  Reviewed repeat HIV/RPR  Reviewed warning signs, normal FKCs, labor precautions and how/when to call.  RTC x 1 wks, call or present sooner prn.   Birth Center Risk Assessment: 1  0- CNM management in ABC  1- CNM management on L&D  2- Consultation with OB to develop plan of care  3- Collaborative CNM/OB management with delivery on L&D  4- Referral or transfer of care to MD

## 2018-10-15 ENCOUNTER — ROUTINE PRENATAL (OUTPATIENT)
Dept: OBSTETRICS AND GYNECOLOGY | Facility: CLINIC | Age: 23
End: 2018-10-15
Payer: COMMERCIAL

## 2018-10-15 VITALS — SYSTOLIC BLOOD PRESSURE: 120 MMHG | DIASTOLIC BLOOD PRESSURE: 82 MMHG | WEIGHT: 180 LBS | BODY MASS INDEX: 30.9 KG/M2

## 2018-10-15 DIAGNOSIS — Z34.93 PRENATAL CARE IN THIRD TRIMESTER: Primary | ICD-10-CM

## 2018-10-15 PROCEDURE — 0502F SUBSEQUENT PRENATAL CARE: CPT | Mod: S$GLB,,, | Performed by: ADVANCED PRACTICE MIDWIFE

## 2018-10-15 PROCEDURE — 99999 PR PBB SHADOW E&M-EST. PATIENT-LVL II: CPT | Mod: PBBFAC,,, | Performed by: ADVANCED PRACTICE MIDWIFE

## 2018-10-16 NOTE — PROGRESS NOTES
23 y.o. female  at 38w3d   With Francesco today  Reports + FM, denies VB, LOF or regular CTX  Declines SVE  Asks for irritation on perineum to be evaluated - noticed it started after being outside walking around for about 4 hours on Saturday at festival. Denies hx of cold sores or genital lesions or STDs ; as does Forrest in visit today   -- Vulva: appears normal, no abn discharge, no lesions appreciated; reviewed hygiene and comfort measures  Also thinks she may have passed a kidney stone yesterday  -- No pain, fever, chills, malaise, or dysuria today  TW lbs   Hx CS. Planning TOLAC, cleared, v/u of policies  Marginal previa, resolved  Left renal agenesis. Aware, has seen MFM, plan to inform peds  Reviewed warning signs, normal FKCs, labor precautions and how/when to call.  RTC x 1 wks, call or present sooner prn.   Birth Center Risk Assessment: 1  0- CNM management in ABC  1- CNM management on L&D  2- Consultation with OB to develop plan of care  3- Collaborative CNM/OB management with delivery on L&D  4- Referral or transfer of care to MD

## 2018-10-22 ENCOUNTER — ROUTINE PRENATAL (OUTPATIENT)
Dept: OBSTETRICS AND GYNECOLOGY | Facility: CLINIC | Age: 23
End: 2018-10-22
Payer: COMMERCIAL

## 2018-10-22 VITALS
SYSTOLIC BLOOD PRESSURE: 122 MMHG | DIASTOLIC BLOOD PRESSURE: 82 MMHG | WEIGHT: 179.69 LBS | BODY MASS INDEX: 30.84 KG/M2

## 2018-10-22 DIAGNOSIS — Z34.93 PRENATAL CARE IN THIRD TRIMESTER: Primary | ICD-10-CM

## 2018-10-22 PROCEDURE — 99999 PR PBB SHADOW E&M-EST. PATIENT-LVL III: CPT | Mod: PBBFAC,,, | Performed by: ADVANCED PRACTICE MIDWIFE

## 2018-10-22 PROCEDURE — 0502F SUBSEQUENT PRENATAL CARE: CPT | Mod: S$GLB,,, | Performed by: ADVANCED PRACTICE MIDWIFE

## 2018-10-22 NOTE — PROGRESS NOTES
23 y.o. female  at 39w3d  With Forrest and son Tomás today  Reports + FM, denies VB, LOF or regular CTX  Requests SVE  Hx CS. Desires TOLAC, cleared, agreeable to policies  Marginal previa, resolved!2  L renal agenesis. Aware, saw MFM, inform peds  TW lbs   Reviewed warning signs, normal FKCs, labor precautions and how/when to call.  RTC x 1 wks, call or present sooner prn.   Birth Center Risk Assessment: 1  0- CNM management in ABC  1- CNM management on L&D  2- Consultation with OB to develop plan of care  3- Collaborative CNM/OB management with delivery on L&D  4- Referral or transfer of care to MD

## 2018-10-29 ENCOUNTER — ROUTINE PRENATAL (OUTPATIENT)
Dept: OBSTETRICS AND GYNECOLOGY | Facility: CLINIC | Age: 23
End: 2018-10-29
Payer: COMMERCIAL

## 2018-10-29 VITALS
SYSTOLIC BLOOD PRESSURE: 120 MMHG | WEIGHT: 179.88 LBS | BODY MASS INDEX: 30.88 KG/M2 | DIASTOLIC BLOOD PRESSURE: 80 MMHG

## 2018-10-29 DIAGNOSIS — O48.0 POST-TERM PREGNANCY, 40-42 WEEKS OF GESTATION: Primary | ICD-10-CM

## 2018-10-29 PROCEDURE — 99999 PR PBB SHADOW E&M-EST. PATIENT-LVL III: CPT | Mod: PBBFAC,,, | Performed by: ADVANCED PRACTICE MIDWIFE

## 2018-10-29 PROCEDURE — 0502F SUBSEQUENT PRENATAL CARE: CPT | Mod: S$GLB,,, | Performed by: ADVANCED PRACTICE MIDWIFE

## 2018-10-30 NOTE — PROGRESS NOTES
23 y.o. female  at 40w3d  With  Forrest and their son Tomás today  Reports + FM, denies VB, LOF or regular CTX  Requests SVE  Hx CS. Continues to desire TOLAC, declines RCS at this time, understands policies  Marginal previa, RESOLVED  L renal agenesis --> aware, saw MFM, will notify peds  TW lbs   Discussed postdates management and IOL - she prefers to await spontaneous labor if possible   Discussed postdates prenatal testing and that IOL would be recommended immediately if prenatal testing is not reassuring; she states understanding and agrees to this  Scheduled NST/ALBERTA starting twice weekly in 41st week  Reviewed warning signs, normal FKCs, labor precautions and how/when to call.  RTC x 3 days, call or present sooner prn.   Birth Center Risk Assessment: 1 (hx CS)  0- CNM management in ABC  1- CNM management on L&D  2- Consultation with OB to develop plan of care  3- Collaborative CNM/OB management with delivery on L&D  4- Referral or transfer of care to MD

## 2018-10-31 ENCOUNTER — TELEPHONE (OUTPATIENT)
Dept: OBSTETRICS AND GYNECOLOGY | Facility: HOSPITAL | Age: 23
End: 2018-10-31

## 2018-10-31 NOTE — TELEPHONE ENCOUNTER
Ret pt call regarding ctx. Which started this am  Denies srom or VB.  States the ctx are not uncomfortable but wanted to give me a head's up.  She will stay home, hydrate and call for any problems or changes

## 2018-11-01 ENCOUNTER — ANESTHESIA EVENT (OUTPATIENT)
Dept: OBSTETRICS AND GYNECOLOGY | Facility: OTHER | Age: 23
End: 2018-11-01
Payer: COMMERCIAL

## 2018-11-01 ENCOUNTER — ANESTHESIA (OUTPATIENT)
Dept: OBSTETRICS AND GYNECOLOGY | Facility: OTHER | Age: 23
End: 2018-11-01
Payer: COMMERCIAL

## 2018-11-01 ENCOUNTER — HOSPITAL ENCOUNTER (INPATIENT)
Facility: OTHER | Age: 23
LOS: 3 days | Discharge: HOME | End: 2018-11-04
Attending: OBSTETRICS & GYNECOLOGY | Admitting: OBSTETRICS & GYNECOLOGY
Payer: COMMERCIAL

## 2018-11-01 DIAGNOSIS — Z37.9 NORMAL LABOR: ICD-10-CM

## 2018-11-01 DIAGNOSIS — O34.219 VBAC (VAGINAL BIRTH AFTER CESAREAN): ICD-10-CM

## 2018-11-01 LAB
ABO + RH BLD: NORMAL
BASOPHILS # BLD AUTO: 0.02 K/UL
BASOPHILS NFR BLD: 0.2 %
BLD GP AB SCN CELLS X3 SERPL QL: NORMAL
DIFFERENTIAL METHOD: ABNORMAL
EOSINOPHIL # BLD AUTO: 0 K/UL
EOSINOPHIL NFR BLD: 0.1 %
ERYTHROCYTE [DISTWIDTH] IN BLOOD BY AUTOMATED COUNT: 12.9 %
HCT VFR BLD AUTO: 40.8 %
HGB BLD-MCNC: 13.5 G/DL
LYMPHOCYTES # BLD AUTO: 1.1 K/UL
LYMPHOCYTES NFR BLD: 9.1 %
MCH RBC QN AUTO: 29.2 PG
MCHC RBC AUTO-ENTMCNC: 33.1 G/DL
MCV RBC AUTO: 88 FL
MONOCYTES # BLD AUTO: 0.9 K/UL
MONOCYTES NFR BLD: 7.1 %
NEUTROPHILS # BLD AUTO: 10 K/UL
NEUTROPHILS NFR BLD: 83.3 %
PLATELET # BLD AUTO: 244 K/UL
PMV BLD AUTO: 11.1 FL
RBC # BLD AUTO: 4.62 M/UL
WBC # BLD AUTO: 12.03 K/UL

## 2018-11-01 PROCEDURE — 3E033VJ INTRODUCTION OF OTHER HORMONE INTO PERIPHERAL VEIN, PERCUTANEOUS APPROACH: ICD-10-PCS | Performed by: ADVANCED PRACTICE MIDWIFE

## 2018-11-01 PROCEDURE — 63600175 PHARM REV CODE 636 W HCPCS: Performed by: ADVANCED PRACTICE MIDWIFE

## 2018-11-01 PROCEDURE — 11000001 HC ACUTE MED/SURG PRIVATE ROOM

## 2018-11-01 PROCEDURE — 72100002 HC LABOR CARE, 1ST 8 HOURS

## 2018-11-01 PROCEDURE — 86850 RBC ANTIBODY SCREEN: CPT

## 2018-11-01 PROCEDURE — 85025 COMPLETE CBC W/AUTO DIFF WBC: CPT

## 2018-11-01 PROCEDURE — 25000003 PHARM REV CODE 250: Performed by: ADVANCED PRACTICE MIDWIFE

## 2018-11-01 RX ORDER — MORPHINE SULFATE 4 MG/ML
10 INJECTION, SOLUTION INTRAMUSCULAR; INTRAVENOUS ONCE
Status: COMPLETED | OUTPATIENT
Start: 2018-11-01 | End: 2018-11-01

## 2018-11-01 RX ORDER — OXYTOCIN/RINGER'S LACTATE 20/1000 ML
20 PLASTIC BAG, INJECTION (ML) INTRAVENOUS ONCE
Status: DISCONTINUED | OUTPATIENT
Start: 2018-11-01 | End: 2018-11-02

## 2018-11-01 RX ORDER — CARBOPROST TROMETHAMINE 250 UG/ML
250 INJECTION, SOLUTION INTRAMUSCULAR
Status: DISCONTINUED | OUTPATIENT
Start: 2018-11-01 | End: 2018-11-02

## 2018-11-01 RX ORDER — MISOPROSTOL 200 UG/1
800 TABLET ORAL
Status: DISCONTINUED | OUTPATIENT
Start: 2018-11-01 | End: 2018-11-02

## 2018-11-01 RX ORDER — PROMETHAZINE HYDROCHLORIDE 25 MG/1
25 TABLET ORAL ONCE
Status: COMPLETED | OUTPATIENT
Start: 2018-11-01 | End: 2018-11-01

## 2018-11-01 RX ORDER — OXYTOCIN/RINGER'S LACTATE 20/1000 ML
41.65 PLASTIC BAG, INJECTION (ML) INTRAVENOUS CONTINUOUS
Status: DISCONTINUED | OUTPATIENT
Start: 2018-11-01 | End: 2018-11-02

## 2018-11-01 RX ORDER — ONDANSETRON 8 MG/1
8 TABLET, ORALLY DISINTEGRATING ORAL EVERY 8 HOURS PRN
Status: DISCONTINUED | OUTPATIENT
Start: 2018-11-01 | End: 2018-11-02

## 2018-11-01 RX ORDER — SODIUM CHLORIDE, SODIUM LACTATE, POTASSIUM CHLORIDE, CALCIUM CHLORIDE 600; 310; 30; 20 MG/100ML; MG/100ML; MG/100ML; MG/100ML
INJECTION, SOLUTION INTRAVENOUS CONTINUOUS
Status: DISCONTINUED | OUTPATIENT
Start: 2018-11-01 | End: 2018-11-02

## 2018-11-01 RX ORDER — ONDANSETRON 2 MG/ML
4 INJECTION INTRAMUSCULAR; INTRAVENOUS EVERY 4 HOURS PRN
Status: DISCONTINUED | OUTPATIENT
Start: 2018-11-01 | End: 2018-11-02

## 2018-11-01 RX ORDER — METHYLERGONOVINE MALEATE 0.2 MG/ML
200 INJECTION INTRAVENOUS
Status: DISCONTINUED | OUTPATIENT
Start: 2018-11-01 | End: 2018-11-02

## 2018-11-01 RX ADMIN — PROMETHAZINE HYDROCHLORIDE 25 MG: 25 TABLET ORAL at 09:11

## 2018-11-01 RX ADMIN — ONDANSETRON 8 MG: 8 TABLET, ORALLY DISINTEGRATING ORAL at 10:11

## 2018-11-01 RX ADMIN — MORPHINE SULFATE 10 MG: 4 INJECTION, SOLUTION INTRAMUSCULAR; INTRAVENOUS at 09:11

## 2018-11-01 NOTE — HOSPITAL COURSE
Admitted to L&D on 18 with active labor   on 18, girl, 2nd degree laceration with repair,  ml  2018: Pt healing well. Normal PP course - d/c home.

## 2018-11-01 NOTE — SUBJECTIVE & OBJECTIVE
Obstetric HPI:  Patient reports Date/time of onset: yesterday am and Frequency: q4-6min contractions mod-strong to palp, active fetal movement, No vaginal bleeding , No loss of fluid     This pregnancy has been complicated by fetal L agenesis (to notify peds), history of  desires     Obstetric History       T1      L1     SAB1   TAB0   Ectopic0   Multiple0   Live Births1       # Outcome Date GA Lbr Yanick/2nd Weight Sex Delivery Anes PTL Lv   3 Current            2 Term 17 41w0d  3.684 kg (8 lb 2 oz) M CS-LTranv EPI N BLESSING      Name: LAYA FLEMING      Complications: Failure to Progress in First Stage      Apgar1:  6                Apgar5: 9   1 SAB 16 6w0d               Past Medical History:   Diagnosis Date    Anemia     past     Past Surgical History:   Procedure Laterality Date     SECTION      DELIVERY- SECTION N/A 2017    Performed by [facility] at [facility]    TONSILLECTOMY      WISDOM TOOTH EXTRACTION         PTA Medications   Medication Sig    clotrimazole (LOTRIMIN) 1 % cream Insert one applicator full nightly x 7 days.    Lactobacillus rhamnosus GG (CULTURELLE) 10 billion cell capsule Take 1 capsule by mouth once daily.    PNV CMB#21/IRON/FOLIC ACID (PRENATAL COMPLETE ORAL) Take by mouth.    progesterone (PROMETRIUM) 200 MG capsule     terconazole (TERAZOL 3) 0.8 % vaginal cream Place 1 applicator vaginally every evening.       Review of patient's allergies indicates:  No Known Allergies     Family History     Problem Relation (Age of Onset)    Diabetes Paternal Grandfather    Emphysema Paternal Grandmother    Glaucoma Maternal Grandmother    Heart block Maternal Grandfather    Heart disease Paternal Grandfather    Hyperlipidemia Paternal Grandfather    Hypertension Paternal Grandfather    No Known Problems Father, Mother        Tobacco Use    Smoking status: Never Smoker    Smokeless tobacco: Never Used   Substance  and Sexual Activity    Alcohol use: No     Alcohol/week: 0.0 oz     Comment: not with pregnancy    Drug use: No    Sexual activity: Yes     Partners: Male     Birth control/protection: None     Review of Systems   Constitutional: Positive for appetite change and fatigue. Negative for activity change, chills and fever.   HENT: Negative.    Eyes: Negative.    Respiratory: Negative for cough, shortness of breath and wheezing.    Cardiovascular: Negative for chest pain, palpitations and leg swelling.   Gastrointestinal: Positive for abdominal pain, nausea and vomiting. Negative for diarrhea.        With contractions.  Small vomit x 2 (dry heaving mostly)   Endocrine: Negative.    Genitourinary: Negative for vaginal bleeding.        Pink show noted on toilet paper when in bathroom   Musculoskeletal: Positive for back pain.        With contractions   Skin:  Negative for rash.   Neurological: Negative for syncope and headaches.   Hematological: Negative.    Psychiatric/Behavioral: Negative for depression. The patient is not nervous/anxious.    Breast: Negative for breast pain     Objective:     Vital Signs (Most Recent):  Temp: 97.1 °F (36.2 °C) (11/01/18 1400)  Pulse: 87 (11/01/18 1424)  Resp: 18 (11/01/18 1400)  BP: 136/86 (11/01/18 1400)  SpO2: 98 % (11/01/18 1424) Vital Signs (24h Range):  Temp:  [97.1 °F (36.2 °C)] 97.1 °F (36.2 °C)  Pulse:  [] 87  Resp:  [18] 18  SpO2:  [94 %-98 %] 98 %  BP: (136)/(86) 136/86     Weight: 81.2 kg (179 lb)  Body mass index is 30.73 kg/m².    FHT: 140, positve accels, no decels, moderate variability Cat 1 fhr TOCO: Q 5 minutes    Physical Exam:   Constitutional: She is oriented to person, place, and time. She appears well-developed and well-nourished.    HENT:   Head: Normocephalic and atraumatic.     Neck: Normal range of motion. Neck supple.    Cardiovascular: Normal rate, regular rhythm and normal heart sounds.     Pulmonary/Chest: Effort normal and breath sounds normal.         Abdominal: Soft. There is no tenderness.     Genitourinary: Vagina normal. No vaginal discharge found.   Genitourinary Comments: SVE 2-3/70/-2  No leaking fluid           Musculoskeletal: Normal range of motion and moves all extremeties.       Neurological: She is alert and oriented to person, place, and time.    Skin: Skin is warm and dry.    Psychiatric: She has a normal mood and affect. Her behavior is normal. Judgment and thought content normal.       Cervix:  Dilation:  3  Effacement:  75%  Station: -2  Presentation: Vertex     Significant Labs:  Lab Results   Component Value Date    GROUPTRH O POS 2018    HEPBSAG Negative 2016    STREPBCULT No Group B Streptococcus isolated 10/01/2018       I have personallly reviewed all pertinent lab results from the last 24 hours.     A) term labor (latent labor), TOLAC--consents signed, Cat 1 fhr  P) admit to labor (patient given option of staying in OB ED for ambulation and rechecking cervix, but due to pain level opted for direct admission, adamant that she did not feel comfortable going home), anticipate --Dr. Melvin notified of patient status, update as necessary.

## 2018-11-01 NOTE — ASSESSMENT & PLAN NOTE
A) Latent/ Early active labor, gbs neg, cat 1 fhr  P) admitted to l/d, dr colvin notified of status, continuous EFM, desires unmedicated, anticipate / .

## 2018-11-01 NOTE — HPI
22yo  at 40w6d called office this am with complaints of regular contractions q4-6 minutes, breathing through them on the phone.  States she started regina yesterday am around 0730 and things have continued to progress over past 24hrs.  Patient has significant hx of primary c/s due to arrest at 4cm, TOLAC desired.  Denies vaginal bleeding, small pink show, denies leaking fluid, +fetal movement.  Patient presents to the ABC breathing through contractions q 4-5min, tearful.

## 2018-11-01 NOTE — H&P
[facility]  Obstetrics  History & Physical    Patient Name: Maria Del Rosario Fleming  MRN: [id number]  Admission Date: 2018  Primary Care Provider: Primary Doctor No    Subjective:     Principal Problem:Normal labor    History of Present Illness:  24yo  at 40w6d called office this am with complaints of regular contractions q4-6 minutes, breathing through them on the phone.  States she started regina yesterday am around 0730 and things have continued to progress over past 24hrs.  Patient has significant hx of primary c/s due to arrest at 4cm, TOLAC desired.  Denies vaginal bleeding, small pink show, denies leaking fluid, +fetal movement.  Patient presents to the ABC breathing through contractions q 4-5min, tearful.      Obstetric HPI:  Patient reports Date/time of onset: yesterday am and Frequency: q4-6min contractions mod-strong to palp, active fetal movement, No vaginal bleeding , No loss of fluid     This pregnancy has been complicated by fetal L agenesis (to notify peds), history of  desires     Obstetric History       T1      L1     SAB1   TAB0   Ectopic0   Multiple0   Live Births1       # Outcome Date GA Lbr Yanick/2nd Weight Sex Delivery Anes PTL Lv   3 Current            2 Term 17 41w0d  3.684 kg (8 lb 2 oz) M CS-LTranv EPI N BLESSING      Name: LAYA FLEMING      Complications: Failure to Progress in First Stage      Apgar1:  6                Apgar5: 9   1 SAB //16 6w0d               Past Medical History:   Diagnosis Date    Anemia     past     Past Surgical History:   Procedure Laterality Date     SECTION  2017    DELIVERY- SECTION N/A 2017    [facility]D    TONSILLECTOMY      WISDOM TOOTH EXTRACTION         PTA Medications   Medication Sig    clotrimazole (LOTRIMIN) 1 % cream Insert one applicator full nightly x 7 days.    Lactobacillus rhamnosus GG (CULTURELLE) 10 billion cell capsule  Take 1 capsule by mouth once daily.    PNV CMB#21/IRON/FOLIC ACID (PRENATAL COMPLETE ORAL) Take by mouth.    progesterone (PROMETRIUM) 200 MG capsule     terconazole (TERAZOL 3) 0.8 % vaginal cream Place 1 applicator vaginally every evening.       Review of patient's allergies indicates:  No Known Allergies     Family History     Problem Relation (Age of Onset)    Diabetes Paternal Grandfather    Emphysema Paternal Grandmother    Glaucoma Maternal Grandmother    Heart block Maternal Grandfather    Heart disease Paternal Grandfather    Hyperlipidemia Paternal Grandfather    Hypertension Paternal Grandfather    No Known Problems Father, Mother        Tobacco Use    Smoking status: Never Smoker    Smokeless tobacco: Never Used   Substance and Sexual Activity    Alcohol use: No     Alcohol/week: 0.0 oz     Comment: not with pregnancy    Drug use: No    Sexual activity: Yes     Partners: Male     Birth control/protection: None     Review of Systems   Constitutional: Positive for appetite change and fatigue. Negative for activity change, chills and fever.   HENT: Negative.    Eyes: Negative.    Respiratory: Negative for cough, shortness of breath and wheezing.    Cardiovascular: Negative for chest pain, palpitations and leg swelling.   Gastrointestinal: Positive for abdominal pain, nausea and vomiting. Negative for diarrhea.        With contractions.  Small vomit x 2 (dry heaving mostly)   Endocrine: Negative.    Genitourinary: Negative for vaginal bleeding.        Pink show noted on toilet paper when in bathroom   Musculoskeletal: Positive for back pain.        With contractions   Skin:  Negative for rash.   Neurological: Negative for syncope and headaches.   Hematological: Negative.    Psychiatric/Behavioral: Negative for depression. The patient is not nervous/anxious.    Breast: Negative for breast pain     Objective:     Vital Signs (Most Recent):  Temp: 97.1 °F (36.2 °C) (11/01/18 1400)  Pulse: 87 (11/01/18  1424)  Resp: 18 (18 1400)  BP: 136/86 (18 1400)  SpO2: 98 % (18 1424) Vital Signs (24h Range):  Temp:  [97.1 °F (36.2 °C)] 97.1 °F (36.2 °C)  Pulse:  [] 87  Resp:  [18] 18  SpO2:  [94 %-98 %] 98 %  BP: (136)/(86) 136/86     Weight: 81.2 kg (179 lb)  Body mass index is 30.73 kg/m².    FHT: 140, positve accels, no decels, moderate variability Cat 1 fhr TOCO: Q 5 minutes    Physical Exam:   Constitutional: She is oriented to person, place, and time. She appears well-developed and well-nourished.    HENT:   Head: Normocephalic and atraumatic.     Neck: Normal range of motion. Neck supple.    Cardiovascular: Normal rate, regular rhythm and normal heart sounds.     Pulmonary/Chest: Effort normal and breath sounds normal.        Abdominal: Soft. There is no tenderness.     Genitourinary: Vagina normal. No vaginal discharge found.   Genitourinary Comments: SVE 2-3/70/-2  No leaking fluid           Musculoskeletal: Normal range of motion and moves all extremeties.       Neurological: She is alert and oriented to person, place, and time.    Skin: Skin is warm and dry.    Psychiatric: She has a normal mood and affect. Her behavior is normal. Judgment and thought content normal.       Cervix:  Dilation:  3  Effacement:  75%  Station: -2  Presentation: Vertex     Significant Labs:  Lab Results   Component Value Date    GROUPTRH O POS 2018    HEPBSAG Negative 2016    STREPBCULT No Group B Streptococcus isolated 10/01/2018       I have personallly reviewed all pertinent lab results from the last 24 hours.     A) term labor (latent labor), TOLAC--consents signed, Cat 1 fhr  P) admit to labor (patient given option of staying in OB ED for ambulation and rechecking cervix, but due to pain level opted for direct admission, adamant that she did not feel comfortable going home), anticipate --Dr. Melvin notified of patient status, update as necessary.    Assessment/Plan:     23 y.o. female  at  40w6d for:    * Normal labor    A) Latent/ Early active labor, gbs neg, cat 1 fhr  P) admitted to l/d, dr colvin notified of status, continuous EFM, desires unmedicated, anticipate / .         Shonda Epstein, JOSI  Obstetrics  [facility]

## 2018-11-01 NOTE — ANESTHESIA PREPROCEDURE EVALUATION
[facility]  Anesthesia Obstetric Pre-Procedure Evaluation         Patient Name: Maria Del Rosario Vasquez  YOB: 1995  MRN: [id number]    SUBJECTIVE:     Pre-operative evaluation for * No procedures listed *     2018    Maria Del Rosario Vasquez is a 23 y.o. female, currently a  at 40w6d. Patient desires TOLAC at this time. Previous C/S 2/2 to failure to progress.      Lab Results   Component Value Date     2018       OB History    Para Term  AB Living   3 1 1 0 1 1   SAB TAB Ectopic Multiple Live Births   1 0 0 0 1      # Outcome Date GA Lbr Yanick/2nd Weight Sex Delivery Anes PTL Lv   3 Current            2 Term 17 41w0d  3.684 kg (8 lb 2 oz) M CS-LTranv EPI N BLESSING      Complications: Failure to Progress in First Stage   1 SAB 16 6w0d             Birth Comments: passed naturally          Patient Active Problem List   Diagnosis    Hx C/S, desires TOLAC, cleared 18    Pregnancy, obstetrical care    Marginal placenta previa    Fetal renal anomaly       Review of patient's allergies indicates:  No Known Allergies    Current Medications:      No current facility-administered medications on file prior to encounter.      Current Outpatient Medications on File Prior to Encounter   Medication Sig Dispense Refill    clotrimazole (LOTRIMIN) 1 % cream Insert one applicator full nightly x 7 days. 30 g 1    Lactobacillus rhamnosus GG (CULTURELLE) 10 billion cell capsule Take 1 capsule by mouth once daily.      PNV CMB#21/IRON/FOLIC ACID (PRENATAL COMPLETE ORAL) Take by mouth.      progesterone (PROMETRIUM) 200 MG capsule       terconazole (TERAZOL 3) 0.8 % vaginal cream Place 1 applicator vaginally every evening. 20 g 1       Past Surgical History:   Procedure Laterality Date     SECTION  2017    DELIVERY- SECTION N/A 2017    Performed by [facility] at [facility] L[facility]D    TONSILLECTOMY      WISDOM TOOTH EXTRACTION         Social  History     Socioeconomic History    Marital status:      Spouse name: Not on file    Number of children: Not on file    Years of education: Not on file    Highest education level: Not on file   Social Needs    Financial resource strain: Not on file    Food insecurity - worry: Not on file    Food insecurity - inability: Not on file    Transportation needs - medical: Not on file    Transportation needs - non-medical: Not on file   Occupational History    Not on file   Tobacco Use    Smoking status: Never Smoker    Smokeless tobacco: Never Used   Substance and Sexual Activity    Alcohol use: No     Alcohol/week: 0.0 oz     Comment: not with pregnancy    Drug use: No    Sexual activity: Yes     Partners: Male     Birth control/protection: None   Other Topics Concern    Not on file   Social History Narrative     Derek pool. 2017 via CS    Second baby for both!    Still in [facility]       OBJECTIVE:     Vital Signs Range (Last 24H):    Temp:  [36.2 °C (97.1 °F)]   Pulse:  []   Resp:  [18]   BP: (136)/(86)   SpO2:  [94 %-98 %]     BP Readings from Last 3 Encounters:   11/01/18 136/86   10/29/18 120/80   10/22/18 122/82           Wt Readings from Last 1 Encounters:   11/01/18 1400 81.2 kg (179 lb)       CBC:   Lab Results   Component Value Date    WBC 12.03 11/01/2018    HGB 13.5 11/01/2018    HCT 40.8 11/01/2018    MCV 88 11/01/2018     11/01/2018       CMP:     Chemistry    No results found for: NA, K, CL, CO2, BUN, CREATININE, GLU No results found for: CALCIUM, ALKPHOS, AST, ALT, BILITOT, ESTGFRAFRICA, EGFRNONAA     INR:  No results for input(s): PT, INR, PROTIME, APTT in the last 72 hours.    Diagnostic Studies: No relevant studies.    EKG: No recent studies available.    2D ECHO:  No results found for this or any previous visit.    ASSESSMENT/PLAN:     Anesthesia Evaluation    I have reviewed the Patient Summary Reports.    I have reviewed the Nursing Notes.   I  have reviewed the Medications.     Review of Systems  Anesthesia Hx:  No problems with previous Anesthesia  Denies Family Hx of Anesthesia complications.   Denies Personal Hx of Anesthesia complications.   Social:  Non-Smoker, No Alcohol Use    Hematology/Oncology:        Denies Current/Recent Cancer   EENT/Dental:   denies chronic allergic rhinitis   Cardiovascular:   Denies Hypertension.  Denies MI.  Denies CAD.    Denies CABG/stent.  Denies Dysrhythmias.             Pulmonary:   Denies COPD.  Denies Asthma.  Denies Recent URI.  Denies Sleep Apnea.    Renal/:   Denies Chronic Renal Disease.     Hepatic/GI:   Denies GERD. Denies Liver Disease.    Neurological:   Denies TIA. Denies CVA. Denies Seizures.    Endocrine:   Denies Diabetes.    Psych:   Denies Psychiatric History.          Physical Exam  General:  Well nourished    Airway/Jaw/Neck:  Airway Findings: Mouth Opening: Normal Tongue: Normal  General Airway Assessment: Adult  Mallampati: III  Improves to II with phonation.  TM Distance: Normal, at least 6 cm  Jaw/Neck Findings:  Neck ROM: Normal ROM      Dental:  Dental Findings: In tact   Chest/Lungs:  Chest/Lungs Findings: Clear to auscultation, Normal Respiratory Rate     Heart/Vascular:  Heart Findings: Rate: Normal  Rhythm: Regular Rhythm  Sounds: Normal     Abdomen:  Abdomen Findings:  Normal, Soft, Nontender     Musculoskeletal:  Musculoskeletal Findings: Normal   Skin:  Skin Findings: Normal    Mental Status:  Mental Status Findings:  Cooperative, Alert and Oriented         Anesthesia Plan  Type of Anesthesia, risks & benefits discussed:  Anesthesia Type:  CSE, epidural, general, spinal  Patient's Preference:   Intra-op Monitoring Plan: standard ASA monitors  Intra-op Monitoring Plan Comments:   Post Op Pain Control Plan: multimodal analgesia, IV/PO Opioids PRN, per primary service following discharge from PACU and intrathecal opioid  Post Op Pain Control Plan Comments:   Induction:    Beta Blocker:   Patient is not currently on a Beta-Blocker (No further documentation required).       Informed Consent: Patient understands risks and agrees with Anesthesia plan.  Questions answered. Anesthesia consent signed with patient.  ASA Score: 2     Day of Surgery Review of History & Physical:    H&P update referred to the provider.         Ready For Surgery From Anesthesia Perspective.

## 2018-11-01 NOTE — PROGRESS NOTES
"S) patient having severe pain with contractions, breathing through contractions, tearful, states she is exhausted as she did not sleep well last night,  O) /86   Pulse 87   Temp 97.1 °F (36.2 °C)   Resp 18   Ht 5' 4" (1.626 m)   Wt 81.2 kg (179 lb)   LMP 2018   SpO2 98%   BMI 30.73 kg/m²    FHR: 140, positive accels, no decels, moderate variability.  Contractions: q2-6min strong to palpation, sve tight 80/-2 bow intact, posterior, soft.   A)TOLAC Latent/ early active labor, fhr category 1  P) patient going to get in tub, consider therapeutic sleep due to prolonged latent phase and maternal exhaustion/ may be opportunity for maternal rest and increase probability of making it through labor without epidural. Anticipate , update DR. DIAS PRN.  "

## 2018-11-02 PROBLEM — Z98.891 S/P CESAREAN SECTION: Status: RESOLVED | Noted: 2017-01-10 | Resolved: 2018-11-02

## 2018-11-02 PROBLEM — O34.219 VBAC (VAGINAL BIRTH AFTER CESAREAN): Status: ACTIVE | Noted: 2018-11-02

## 2018-11-02 PROCEDURE — 63600175 PHARM REV CODE 636 W HCPCS: Performed by: STUDENT IN AN ORGANIZED HEALTH CARE EDUCATION/TRAINING PROGRAM

## 2018-11-02 PROCEDURE — 72200005 HC VAGINAL DELIVERY LEVEL II

## 2018-11-02 PROCEDURE — 51702 INSERT TEMP BLADDER CATH: CPT

## 2018-11-02 PROCEDURE — 27800517 HC TRAY,EPIDURAL-CONTINUOUS: Performed by: ANESTHESIOLOGY

## 2018-11-02 PROCEDURE — 25000003 PHARM REV CODE 250: Performed by: STUDENT IN AN ORGANIZED HEALTH CARE EDUCATION/TRAINING PROGRAM

## 2018-11-02 PROCEDURE — 0KQM0ZZ REPAIR PERINEUM MUSCLE, OPEN APPROACH: ICD-10-PCS | Performed by: ADVANCED PRACTICE MIDWIFE

## 2018-11-02 PROCEDURE — 27200710 HC EPIDURAL INFUSION PUMP SET: Performed by: ANESTHESIOLOGY

## 2018-11-02 PROCEDURE — 62326 NJX INTERLAMINAR LMBR/SAC: CPT | Performed by: ANESTHESIOLOGY

## 2018-11-02 PROCEDURE — 63600175 PHARM REV CODE 636 W HCPCS

## 2018-11-02 PROCEDURE — 25000003 PHARM REV CODE 250: Performed by: ADVANCED PRACTICE MIDWIFE

## 2018-11-02 PROCEDURE — 25000003 PHARM REV CODE 250: Performed by: ANESTHESIOLOGY

## 2018-11-02 PROCEDURE — 11000001 HC ACUTE MED/SURG PRIVATE ROOM

## 2018-11-02 PROCEDURE — 10907ZC DRAINAGE OF AMNIOTIC FLUID, THERAPEUTIC FROM PRODUCTS OF CONCEPTION, VIA NATURAL OR ARTIFICIAL OPENING: ICD-10-PCS | Performed by: ADVANCED PRACTICE MIDWIFE

## 2018-11-02 PROCEDURE — 63600175 PHARM REV CODE 636 W HCPCS: Performed by: ADVANCED PRACTICE MIDWIFE

## 2018-11-02 RX ORDER — OXYTOCIN/RINGER'S LACTATE 20/1000 ML
PLASTIC BAG, INJECTION (ML) INTRAVENOUS
Status: COMPLETED
Start: 2018-11-02 | End: 2018-11-02

## 2018-11-02 RX ORDER — OXYTOCIN/RINGER'S LACTATE 20/1000 ML
2 PLASTIC BAG, INJECTION (ML) INTRAVENOUS CONTINUOUS
Status: DISCONTINUED | OUTPATIENT
Start: 2018-11-02 | End: 2018-11-02

## 2018-11-02 RX ORDER — OXYTOCIN/RINGER'S LACTATE 20/1000 ML
333 PLASTIC BAG, INJECTION (ML) INTRAVENOUS CONTINUOUS
Status: DISCONTINUED | OUTPATIENT
Start: 2018-11-02 | End: 2018-11-02

## 2018-11-02 RX ORDER — DOCUSATE SODIUM 100 MG/1
200 CAPSULE, LIQUID FILLED ORAL 2 TIMES DAILY PRN
Status: DISCONTINUED | OUTPATIENT
Start: 2018-11-02 | End: 2018-11-04 | Stop reason: HOSPADM

## 2018-11-02 RX ORDER — SODIUM CHLORIDE, SODIUM LACTATE, POTASSIUM CHLORIDE, CALCIUM CHLORIDE 600; 310; 30; 20 MG/100ML; MG/100ML; MG/100ML; MG/100ML
INJECTION, SOLUTION INTRAVENOUS CONTINUOUS
Status: DISCONTINUED | OUTPATIENT
Start: 2018-11-02 | End: 2018-11-02

## 2018-11-02 RX ORDER — FAMOTIDINE 10 MG/ML
20 INJECTION INTRAVENOUS ONCE
Status: DISCONTINUED | OUTPATIENT
Start: 2018-11-02 | End: 2018-11-02

## 2018-11-02 RX ORDER — DIPHENHYDRAMINE HYDROCHLORIDE 50 MG/ML
25 INJECTION INTRAMUSCULAR; INTRAVENOUS EVERY 4 HOURS PRN
Status: DISCONTINUED | OUTPATIENT
Start: 2018-11-02 | End: 2018-11-04 | Stop reason: HOSPADM

## 2018-11-02 RX ORDER — OXYTOCIN/RINGER'S LACTATE 20/1000 ML
41.65 PLASTIC BAG, INJECTION (ML) INTRAVENOUS CONTINUOUS
Status: ACTIVE | OUTPATIENT
Start: 2018-11-02 | End: 2018-11-03

## 2018-11-02 RX ORDER — ACETAMINOPHEN 325 MG/1
650 TABLET ORAL EVERY 6 HOURS PRN
Status: DISCONTINUED | OUTPATIENT
Start: 2018-11-02 | End: 2018-11-04 | Stop reason: HOSPADM

## 2018-11-02 RX ORDER — BUPIVACAINE HYDROCHLORIDE 2.5 MG/ML
INJECTION, SOLUTION EPIDURAL; INFILTRATION; INTRACAUDAL
Status: DISPENSED
Start: 2018-11-02 | End: 2018-11-03

## 2018-11-02 RX ORDER — HYDROCORTISONE 25 MG/G
CREAM TOPICAL 3 TIMES DAILY PRN
Status: DISCONTINUED | OUTPATIENT
Start: 2018-11-02 | End: 2018-11-04 | Stop reason: HOSPADM

## 2018-11-02 RX ORDER — ACETAMINOPHEN 325 MG/1
650 TABLET ORAL EVERY 6 HOURS PRN
Status: DISCONTINUED | OUTPATIENT
Start: 2018-11-02 | End: 2018-11-03

## 2018-11-02 RX ORDER — IBUPROFEN 600 MG/1
600 TABLET ORAL EVERY 6 HOURS PRN
Status: DISCONTINUED | OUTPATIENT
Start: 2018-11-02 | End: 2018-11-04 | Stop reason: HOSPADM

## 2018-11-02 RX ORDER — FENTANYL/BUPIVACAINE/NS/PF 2MCG/ML-.1
PLASTIC BAG, INJECTION (ML) INJECTION CONTINUOUS PRN
Status: DISCONTINUED | OUTPATIENT
Start: 2018-11-02 | End: 2018-11-02

## 2018-11-02 RX ORDER — BUPIVACAINE HYDROCHLORIDE 2.5 MG/ML
INJECTION, SOLUTION EPIDURAL; INFILTRATION; INTRACAUDAL
Status: DISPENSED
Start: 2018-11-02 | End: 2018-11-02

## 2018-11-02 RX ORDER — SODIUM CITRATE AND CITRIC ACID MONOHYDRATE 334; 500 MG/5ML; MG/5ML
30 SOLUTION ORAL ONCE
Status: DISCONTINUED | OUTPATIENT
Start: 2018-11-02 | End: 2018-11-02

## 2018-11-02 RX ORDER — DIPHENHYDRAMINE HCL 25 MG
25 CAPSULE ORAL EVERY 4 HOURS PRN
Status: DISCONTINUED | OUTPATIENT
Start: 2018-11-02 | End: 2018-11-04 | Stop reason: HOSPADM

## 2018-11-02 RX ORDER — FENTANYL CITRATE 50 UG/ML
INJECTION, SOLUTION INTRAMUSCULAR; INTRAVENOUS
Status: DISCONTINUED | OUTPATIENT
Start: 2018-11-02 | End: 2018-11-02

## 2018-11-02 RX ORDER — FENTANYL CITRATE 50 UG/ML
INJECTION, SOLUTION INTRAMUSCULAR; INTRAVENOUS
Status: COMPLETED
Start: 2018-11-02 | End: 2018-11-02

## 2018-11-02 RX ORDER — FENTANYL/BUPIVACAINE/NS/PF 2MCG/ML-.1
PLASTIC BAG, INJECTION (ML) INJECTION
Status: DISPENSED
Start: 2018-11-02 | End: 2018-11-02

## 2018-11-02 RX ORDER — ONDANSETRON 8 MG/1
8 TABLET, ORALLY DISINTEGRATING ORAL EVERY 8 HOURS PRN
Status: DISCONTINUED | OUTPATIENT
Start: 2018-11-02 | End: 2018-11-04 | Stop reason: HOSPADM

## 2018-11-02 RX ORDER — OXYTOCIN/RINGER'S LACTATE 20/1000 ML
41.7 PLASTIC BAG, INJECTION (ML) INTRAVENOUS CONTINUOUS
Status: DISCONTINUED | OUTPATIENT
Start: 2018-11-02 | End: 2018-11-02

## 2018-11-02 RX ORDER — LIDOCAINE HYDROCHLORIDE AND EPINEPHRINE 15; 5 MG/ML; UG/ML
INJECTION, SOLUTION EPIDURAL
Status: DISCONTINUED | OUTPATIENT
Start: 2018-11-02 | End: 2018-11-02

## 2018-11-02 RX ADMIN — Medication 10 ML: at 05:11

## 2018-11-02 RX ADMIN — ONDANSETRON 4 MG: 2 INJECTION INTRAMUSCULAR; INTRAVENOUS at 01:11

## 2018-11-02 RX ADMIN — Medication 2 MILLI-UNITS/MIN: at 11:11

## 2018-11-02 RX ADMIN — ONDANSETRON 4 MG: 2 INJECTION INTRAMUSCULAR; INTRAVENOUS at 03:11

## 2018-11-02 RX ADMIN — Medication 10 ML: at 03:11

## 2018-11-02 RX ADMIN — Medication 10 ML/HR: at 02:11

## 2018-11-02 RX ADMIN — FENTANYL CITRATE 100 MCG: 50 INJECTION, SOLUTION INTRAMUSCULAR; INTRAVENOUS at 05:11

## 2018-11-02 RX ADMIN — FENTANYL CITRATE 100 MCG: 50 INJECTION, SOLUTION INTRAMUSCULAR; INTRAVENOUS at 03:11

## 2018-11-02 RX ADMIN — ONDANSETRON 4 MG: 2 INJECTION INTRAMUSCULAR; INTRAVENOUS at 07:11

## 2018-11-02 RX ADMIN — SODIUM CHLORIDE, SODIUM LACTATE, POTASSIUM CHLORIDE, AND CALCIUM CHLORIDE 1000 ML: .6; .31; .03; .02 INJECTION, SOLUTION INTRAVENOUS at 01:11

## 2018-11-02 RX ADMIN — LIDOCAINE HYDROCHLORIDE,EPINEPHRINE BITARTRATE 3 ML: 15; .005 INJECTION, SOLUTION EPIDURAL; INFILTRATION; INTRACAUDAL; PERINEURAL at 02:11

## 2018-11-02 NOTE — PROGRESS NOTES
[facility]  Obstetrics  Labor Progress Note    Patient Name: Maria Del Rosario Vasquez  MRN: [id number]  Admission Date: 2018  Hospital Length of Stay: 1 days  Attending Physician: Estuardo Melvin MD  Primary Care Provider: Primary Doctor No    Subjective:     Principal Problem:Normal labor    Hospital Course:  1300 SVE 2-3/70/-2 intact BOW, posterior, soft  Admitted to L&D   2018 @ 1300: Pt resting with epidural. Nausea somewhat improved. Pitocin x 1.5 hours - plan to recheck cervix at 1400.     Interval History:  Maria Del Rosario is a 23 y.o.  at 41w0d. She is doing well.     Objective:     Vital Signs (Most Recent):  Temp: 98.1 °F (36.7 °C) (18 0005)  Pulse: 88 (18 0548)  Resp: 18 (18 0005)  BP: 125/71 (18 0548)  SpO2: 95 % (18 0545) Vital Signs (24h Range):  Temp:  [97.1 °F (36.2 °C)-98.2 °F (36.8 °C)] 98.1 °F (36.7 °C)  Pulse:  [] 88  Resp:  [18] 18  SpO2:  [94 %-100 %] 95 %  BP: (111-169)/(61-93) 125/71     Weight: 81.2 kg (179 lb)  Body mass index is 30.73 kg/m².    FHT: Cat 1   TOCO: Q 2-4 minutes         Significant Labs:  Lab Results   Component Value Date    GROUPTRH O POS 2018    HEPBSAG Negative 2016    STREPBCULT No Group B Streptococcus isolated 10/01/2018       I have personallly reviewed all pertinent lab results from the last 24 hours.    Physical Exam:   Constitutional: She is oriented to person, place, and time. She appears well-developed and well-nourished. No distress.    HENT:   Head: Normocephalic and atraumatic.     Neck: Normal range of motion.    Cardiovascular: Normal rate and regular rhythm.     Pulmonary/Chest: Effort normal. No respiratory distress.                      Neurological: She is alert and oriented to person, place, and time.    Skin: Skin is warm and dry. She is not diaphoretic.    Psychiatric: She has a normal mood and affect. Her behavior is normal. Judgment and thought content normal.       Assessment/Plan:     23  y.o. female  at 41w0d for:    * Normal labor    Continue Pitocin augmentation - Plan VE for 1400 and place IUPC if no or minimal change.            Zhanna Florse CNM  Obstetrics  [facility]

## 2018-11-02 NOTE — ANESTHESIA PROCEDURE NOTES
Epidural    Patient location during procedure: OB   Reason for block: primary anesthetic   Diagnosis: IUP   Start time: 11/2/2018 2:40 AM  Timeout: 11/2/2018 2:38 AM  End time: 11/2/2018 2:46 AM  Staffing  Anesthesiologist: Sharona Parks MD  Performed: anesthesiologist   Preanesthetic Checklist  Completed: patient identified, site marked, surgical consent, pre-op evaluation, timeout performed, IV checked, risks and benefits discussed, monitors and equipment checked, anesthesia consent given, hand hygiene performed and patient being monitored  Preparation  Patient position: sitting  Prep: ChloraPrep  Patient monitoring: Pulse Ox and Blood Pressure  Epidural  Skin Anesthetic: lidocaine 1%  Skin Wheal: 3 mL  Administration type: continuous  Approach: midline  Interspace: L2-3  Injection technique: LISA air  Needle and Epidural Catheter  Needle type: Tuohy   Needle gauge: 17  Needle length: 3.5 inches  Needle insertion depth: 5 cm  Catheter type: springwound and multi-orifice  Catheter size: 18 G  Catheter at skin depth: 10 cm  Test dose: 3 mL  Additional Documentation: incremental injection, negative aspiration for heme and CSF, no paresthesia on injection, no significant complaints from patient and no signs/symptoms of IV or SA injection  Needle localization: anatomical landmarks  Additional Notes  Dural puncture performed with 25G spinal needle. 5ml bolus x 2 from bag of 0.1% bupiv + 2mcg/ml fentanyl incrementally after test dose. BP stable throughout

## 2018-11-02 NOTE — PROGRESS NOTES
[facility]  Obstetrics  Labor Progress Note    Patient Name: Maria Del Rosario Vasquez  MRN: [id number]  Admission Date: 2018  Hospital Length of Stay: 0 days  Attending Physician: Estuardo Melvin MD  Primary Care Provider: Primary Doctor No    Subjective:     Principal Problem:Normal labor/ TOLAC    Interval History:  Maria Del Rosario is a 23 y.o.  at 40w6d. She is laboring in severe pain, crying and breathing through contractions.  Tub was helpful for short period of time, now out of tub and continues to c/o pain with contractions.    Objective:     Vital Signs (Most Recent):  Temp: 98.1 °F (36.7 °C) (18)  Pulse: 109 (18)  Resp: 18 (18)  BP: 134/61 (18)  SpO2: 100 % (18) Vital Signs (24h Range):  Temp:  [97.1 °F (36.2 °C)-98.2 °F (36.8 °C)] 98.1 °F (36.7 °C)  Pulse:  [] 109  Resp:  [18] 18  SpO2:  [94 %-100 %] 100 %  BP: (111-136)/(61-86) 134/61     Weight: 81.2 kg (179 lb)  Body mass index is 30.73 kg/m².    FHT: 140, positive accels, no decels, moderate variability. Cat 1 (reassuring)  TOCO:  Q 2-5 minutes, strong to palpation    Physical Exam    Cervical Exam:  Dilation:  4  Effacement:  75%  Station: -2  Presentation: Vertex     Significant Labs:  Lab Results   Component Value Date    GROUPTRH O POS 2018    HEPBSAG Negative 2016    STREPBCULT No Group B Streptococcus isolated 10/01/2018       I have personallly reviewed all pertinent lab results from the last 24 hours.    Assessment/Plan:     23 y.o. female  at 40w6d for:    Active Diagnoses:    Diagnosis Date Noted POA    PRINCIPAL PROBLEM:  Normal labor [O80, Z37.9] 2018 Not Applicable      Problems Resolved During this Admission:       A)  latent labor w/ maternal exhaustion, cat 1 fhr, gbs negative, bow intact  P) discussed options for patient including epidural, therapeutic sleep with morphine IM and/or continue expectant management.  Risks/ benefits and  alternatives discussed with patient and her partner, questions answered and she opts to try IM morphine.  Orders placed. F/u in one hour.  Patient and her  reassured of normal latent labor progress and fetal status.  Nurse to place patient on pulse oximetry during im narcotic admin, continuous fetal monitoring. Dr. Borges now on call and will be updated.  Shonda Epstein CNM  Obstetrics  [facility]

## 2018-11-02 NOTE — PROGRESS NOTES
[facility]  Obstetrics  Labor Progress Note    Patient Name: Maria Del Rosario Vasquez  MRN: [id number]  Admission Date: 2018  Hospital Length of Stay: 1 days  Attending Physician: Estuardo Melvin MD  Primary Care Provider: Primary Doctor No    Subjective:     Principal Problem:Normal labor    Hospital Course:  1300 SVE 2-3/-2 intact BOW, posterior, soft  Admitted to L&D   2018 @ 1300: Pt resting with epidural. Nausea somewhat improved. Pitocin x 1.5 hours - plan to recheck cervix at 1400.   2018 @ 1400: 590/-2 - IUPC placed     Interval History:  Maria Del Rosario is a 23 y.o.  at 41w0d. She is doing well.     Objective:     Vital Signs (Most Recent):  Temp: 98.1 °F (36.7 °C) (18 0005)  Pulse: 88 (18 0548)  Resp: 18 (18 0005)  BP: 125/71 (18 0548)  SpO2: 95 % (18 0545) Vital Signs (24h Range):  Temp:  [98.1 °F (36.7 °C)-98.2 °F (36.8 °C)] 98.1 °F (36.7 °C)  Pulse:  [] 88  Resp:  [18] 18  SpO2:  [94 %-100 %] 95 %  BP: (111-169)/(61-93) 125/71     Weight: 81.2 kg (179 lb)  Body mass index is 30.73 kg/m².    FHT: Cat 1   TOCO: Q 1.5-4 minutes    Cervical Exam:  Dilation:  5  Effacement:  90  Station: -2  Presentation: Vertex     Significant Labs:  Lab Results   Component Value Date    GROUPTRH O POS 2018    HEPBSAG Negative 2016    STREPBCULT No Group B Streptococcus isolated 10/01/2018       I have personallly reviewed all pertinent lab results from the last 24 hours.    Physical Exam:   Constitutional: She is oriented to person, place, and time. She appears well-developed and well-nourished. No distress.      Neck: Normal range of motion.    Cardiovascular: Normal rate.     Pulmonary/Chest: Effort normal. No respiratory distress.        Abdominal: Soft.     Genitourinary: Vaginal discharge (clear fluid and bloody show) found.           Musculoskeletal: Normal range of motion.       Neurological: She is alert and oriented to person, place, and time.     Skin: Skin is warm and dry. She is not diaphoretic.    Psychiatric: She has a normal mood and affect. Her behavior is normal. Judgment and thought content normal.       Assessment/Plan:     23 y.o. female  at 41w0d for:    * Normal labor    Continue Pitocin augmentation - IUPC placed with this exam.      Hx C/S, desires TOLAC, cleared 18    Continue close monitoring of maternal and fetal status            Zhanna Flores CNM  Obstetrics  [facility]

## 2018-11-02 NOTE — SUBJECTIVE & OBJECTIVE
Interval History:  Maria Del Rosario is a 23 y.o.  at 41w0d. She is doing well.     Objective:     Vital Signs (Most Recent):  Temp: 98.1 °F (36.7 °C) (18 0005)  Pulse: 88 (18 0548)  Resp: 18 (18 0005)  BP: 125/71 (18 0548)  SpO2: 95 % (18 0545) Vital Signs (24h Range):  Temp:  [98.1 °F (36.7 °C)-98.2 °F (36.8 °C)] 98.1 °F (36.7 °C)  Pulse:  [] 88  Resp:  [18] 18  SpO2:  [94 %-100 %] 95 %  BP: (111-169)/(61-93) 125/71     Weight: 81.2 kg (179 lb)  Body mass index is 30.73 kg/m².    FHT: Cat 1   TOCO: Q 1.5-4 minutes    Cervical Exam:  Dilation:  5  Effacement:  90  Station: -2  Presentation: Vertex     Significant Labs:  Lab Results   Component Value Date    GROUPTRH O POS 2018    HEPBSAG Negative 2016    STREPBCULT No Group B Streptococcus isolated 10/01/2018       I have personallly reviewed all pertinent lab results from the last 24 hours.    Physical Exam:   Constitutional: She is oriented to person, place, and time. She appears well-developed and well-nourished. No distress.      Neck: Normal range of motion.    Cardiovascular: Normal rate.     Pulmonary/Chest: Effort normal. No respiratory distress.        Abdominal: Soft.     Genitourinary: Vaginal discharge (clear fluid and bloody show) found.           Musculoskeletal: Normal range of motion.       Neurological: She is alert and oriented to person, place, and time.    Skin: Skin is warm and dry. She is not diaphoretic.    Psychiatric: She has a normal mood and affect. Her behavior is normal. Judgment and thought content normal.

## 2018-11-02 NOTE — SUBJECTIVE & OBJECTIVE
Interval History:  Maria Del Rosario is a 23 y.o.  at 41w0d. She is doing well.     Objective:     Vital Signs (Most Recent):  Temp: 98.1 °F (36.7 °C) (18 0005)  Pulse: 88 (18 0548)  Resp: 18 (18 0005)  BP: 125/71 (18 0548)  SpO2: 95 % (18 0545) Vital Signs (24h Range):  Temp:  [97.1 °F (36.2 °C)-98.2 °F (36.8 °C)] 98.1 °F (36.7 °C)  Pulse:  [] 88  Resp:  [18] 18  SpO2:  [94 %-100 %] 95 %  BP: (111-169)/(61-93) 125/71     Weight: 81.2 kg (179 lb)  Body mass index is 30.73 kg/m².    FHT: Cat 1   TOCO: Q 2-4 minutes         Significant Labs:  Lab Results   Component Value Date    GROUPTRH O POS 2018    HEPBSAG Negative 2016    STREPBCULT No Group B Streptococcus isolated 10/01/2018       I have personallly reviewed all pertinent lab results from the last 24 hours.    Physical Exam:   Constitutional: She is oriented to person, place, and time. She appears well-developed and well-nourished. No distress.    HENT:   Head: Normocephalic and atraumatic.     Neck: Normal range of motion.    Cardiovascular: Normal rate and regular rhythm.     Pulmonary/Chest: Effort normal. No respiratory distress.                      Neurological: She is alert and oriented to person, place, and time.    Skin: Skin is warm and dry. She is not diaphoretic.    Psychiatric: She has a normal mood and affect. Her behavior is normal. Judgment and thought content normal.

## 2018-11-02 NOTE — PROGRESS NOTES
"S) patient had some rest after IM morphine, has continued to have painful contractions with better ability to rest in between.  She c/o continued nausea after phenergan and zofran, worsens with movement. Requesting epidural.  Tearful, states she is exhausted and hoping to rest once epidural is placed.  O) vss, afebrile /79   Pulse 103   Temp 98.1 °F (36.7 °C)   Resp 18   Ht 5' 4" (1.626 m)   Wt 81.2 kg (179 lb)   LMP 2018   SpO2 100%   BMI 30.73 kg/m²   Sve: 4+/80/-2 bulging bow noted, fhr 125-130's, positive accels, no decels, moderate variability. Contractions q 4-5 min strong to palpation.   A) Labor-early active/ TOLAC, cat 1 fhr, gbs negative  P) anesthesia notified-epidural ordered, dr. mabry updated, anticipate .  "

## 2018-11-03 PROBLEM — Z37.9 NORMAL LABOR: Status: RESOLVED | Noted: 2018-11-01 | Resolved: 2018-11-03

## 2018-11-03 PROBLEM — O44.20 MARGINAL PLACENTA PREVIA: Status: RESOLVED | Noted: 2018-06-04 | Resolved: 2018-11-03

## 2018-11-03 PROBLEM — Z34.90 PREGNANCY, OBSTETRICAL CARE: Status: RESOLVED | Noted: 2018-04-25 | Resolved: 2018-11-03

## 2018-11-03 LAB
ANISOCYTOSIS BLD QL SMEAR: SLIGHT
BASOPHILS # BLD AUTO: 0.01 K/UL
BASOPHILS NFR BLD: 0.1 %
DIFFERENTIAL METHOD: ABNORMAL
EOSINOPHIL # BLD AUTO: 0 K/UL
EOSINOPHIL NFR BLD: 0.2 %
ERYTHROCYTE [DISTWIDTH] IN BLOOD BY AUTOMATED COUNT: 13.3 %
GIANT PLATELETS BLD QL SMEAR: PRESENT
HCT VFR BLD AUTO: 32.9 %
HGB BLD-MCNC: 10.7 G/DL
LYMPHOCYTES # BLD AUTO: 1.4 K/UL
LYMPHOCYTES NFR BLD: 8.5 %
MCH RBC QN AUTO: 29 PG
MCHC RBC AUTO-ENTMCNC: 32.5 G/DL
MCV RBC AUTO: 89 FL
MONOCYTES # BLD AUTO: 2.4 K/UL
MONOCYTES NFR BLD: 14.2 %
NEUTROPHILS # BLD AUTO: 12.8 K/UL
NEUTROPHILS NFR BLD: 77 %
PLATELET # BLD AUTO: 206 K/UL
PLATELET BLD QL SMEAR: ABNORMAL
PMV BLD AUTO: 10.4 FL
POLYCHROMASIA BLD QL SMEAR: ABNORMAL
RBC # BLD AUTO: 3.69 M/UL
TARGETS BLD QL SMEAR: ABNORMAL
WBC # BLD AUTO: 16.71 K/UL

## 2018-11-03 PROCEDURE — 25000003 PHARM REV CODE 250: Performed by: ADVANCED PRACTICE MIDWIFE

## 2018-11-03 PROCEDURE — 72100003 HC LABOR CARE, EA. ADDL. 8 HRS

## 2018-11-03 PROCEDURE — 11000001 HC ACUTE MED/SURG PRIVATE ROOM

## 2018-11-03 PROCEDURE — 36415 COLL VENOUS BLD VENIPUNCTURE: CPT

## 2018-11-03 PROCEDURE — 85025 COMPLETE CBC W/AUTO DIFF WBC: CPT

## 2018-11-03 RX ORDER — ACETAMINOPHEN 325 MG/1
650 TABLET ORAL EVERY 6 HOURS PRN
Status: DISCONTINUED | OUTPATIENT
Start: 2018-11-03 | End: 2018-11-04 | Stop reason: HOSPADM

## 2018-11-03 RX ADMIN — IBUPROFEN 600 MG: 600 TABLET ORAL at 12:11

## 2018-11-03 RX ADMIN — HYDROCORTISONE 2.5%: 25 CREAM TOPICAL at 04:11

## 2018-11-03 RX ADMIN — ACETAMINOPHEN 650 MG: 325 TABLET ORAL at 09:11

## 2018-11-03 RX ADMIN — IBUPROFEN 600 MG: 600 TABLET ORAL at 05:11

## 2018-11-03 RX ADMIN — ACETAMINOPHEN 650 MG: 325 TABLET ORAL at 04:11

## 2018-11-03 RX ADMIN — IBUPROFEN 600 MG: 600 TABLET ORAL at 06:11

## 2018-11-03 RX ADMIN — IBUPROFEN 600 MG: 600 TABLET ORAL at 11:11

## 2018-11-03 NOTE — LACTATION NOTE
11/03/18 1730   Maternal Infant Assessment   Breast Shape round   Breast Density filling   Areola elastic   Nipple(s) graspable;everted   Infant Assessment   Sucking Reflex present   Rooting Reflex present   Swallow Reflex present   Breasts WDL   Breasts WDL WDL   Lactation Referrals   Lactation Consult Breastfeeding assessment;Initial assessment;Knowledge deficit   Lactation Interventions   Attachment Promotion breastfeeding assistance provided;face-to-face positioning promoted;rooming-in promoted;skin-to-skin contact encouraged   Breastfeeding Assistance assisted with positioning;infant latch-on verified;infant suck/swallow verified;support offered;feeding session observed   Maternal Breastfeeding Support lactation counseling provided

## 2018-11-03 NOTE — PROGRESS NOTES
Notified JOSI Apodaca that patient was unable to pee at the 4 hour imtiaz (2130). Patient refused in and out cath and requested more time. Paulie stated that patient may have two more hours, 6 hours total, to try peeing on her own. If after 6 hours, which would be at 2330, then she would need an in and out cath. Patient verbalizes understanding.

## 2018-11-03 NOTE — SUBJECTIVE & OBJECTIVE
Hospital course: Admitted to L&D on 18 with active labor   on 18, girl, 2nd degree laceration with repair,  ml    Interval History: PPD#1, stable    She is doing well this morning. She is tolerating a regular diet without nausea or vomiting. She is voiding spontaneously. She is ambulating. She has passed flatus, and has not a BM. Vaginal bleeding is mild. She denies fever or chills. Abdominal pain is mild and controlled with oral medications. She is breastfeeding. She desires circumcision for her male baby: not applicable.  She is c/o lower back pain which is not relieved with ibuprofen but which feels somewhat better with ambulation.    Objective:     Vital Signs (Most Recent):  Temp: 97.7 °F (36.5 °C) (18)  Pulse: 99 (18)  Resp: 18 (18)  BP: 121/74 (18)  SpO2: 97 % (18) Vital Signs (24h Range):  Temp:  [97.7 °F (36.5 °C)-99.1 °F (37.3 °C)] 97.7 °F (36.5 °C)  Pulse:  [] 99  Resp:  [16-20] 18  SpO2:  [96 %-100 %] 97 %  BP: (121-143)/(59-84) 121/74     Weight: 81.2 kg (179 lb)  Body mass index is 30.73 kg/m².      Intake/Output Summary (Last 24 hours) at 11/3/2018 0913  Last data filed at 11/3/2018 0100  Gross per 24 hour   Intake --   Output 900 ml   Net -900 ml       Significant Labs:  Lab Results   Component Value Date    GROUPTRH O POS 2018    HEPBSAG Negative 2016    STREPBCULT No Group B Streptococcus isolated 10/01/2018     Recent Labs   Lab 18   HGB 10.7*   HCT 32.9*       CBC:   Recent Labs   Lab 18   WBC 16.71*   RBC 3.69*   HGB 10.7*   HCT 32.9*      MCV 89   MCH 29.0   MCHC 32.5     I have personallly reviewed all pertinent lab results from the last 24 hours.    Physical Exam:   Constitutional: She is oriented to person, place, and time. She appears well-developed and well-nourished.       Cardiovascular: Normal rate.     Pulmonary/Chest: Effort normal.        Abdominal: Soft.              Musculoskeletal: Normal range of motion.       Neurological: She is alert and oriented to person, place, and time.    Skin: Skin is warm and dry.    Psychiatric: She has a normal mood and affect.

## 2018-11-03 NOTE — ASSESSMENT & PLAN NOTE
H&H this am 10.7/32.9  Denies dizziness or feeling faint  Encouraged to increase fluids and iron rich foods, resume prenatal vitamin with iron when returns home

## 2018-11-03 NOTE — DISCHARGE INSTRUCTIONS
Breastfeeding discharge instructions given with First Alert form and reviewed.  Also discussed:   AAP recommendation of exclusive breastfeeding for the first 6 months of life and continued breastfeeding with the introduction of supplemental foods beyond the first year of life.  Instructed on the recommendation to delay all bottle and pacifier use until after 4 weeks of age and breastfeeding is well established.  Discussed the benefits of exclusive breastfeeding for both mother and baby.  Discussed the risks of supplementation/pacifier use on the exclusivity of breastfeeding in the first 6 months. Feed the baby at the earliest sign of hunger or comfort  o Hands to mouth, sucking motions  o Rooting or searching for something to suck on  o Dont wait for crying - it is a not a late sign of hunger; it is a sign of distress     The feedings may be 8-12 times per 24hrs and will not follow a schedule   Alternate the breast you start the feeding with, or start with the breast that feels the fullest   Switch breasts when the baby takes himself off the breast or falls asleep   Keep offering breasts until the baby looks full, no longer gives hunger signs, and stays asleep when placed on his back in the crib   If the baby is sleepy and wont wake for a feeding, put the baby skin-to-skin dressed in a diaper against the mothers bare chest   Sleep near your baby   The baby should be positioned and latched on to the breast correctly  o Chest-to-chest, chin in the breast  o Babys lips are flipped outward  o Babys mouth is stretched open wide like a shout  o Babys sucking should feel like tugging to the mother  - The baby should be drinking at the breast:  o You should hear swallowing or gulping throughout the feeding  o You should see milk on the babys lips when he comes off the breast  o Your breasts should be softer when the baby is finished feeding  o The baby should look relaxed at the end of feedings  o After  the 4th day and your milk is in:  o The babys poop should turn bright yellow and be loose, watery, and seedy  o The baby should have at least 3-4 poops the size of the palm of your hand per day  o The baby should have at least 6-8 wet diapers per day  o The urine should be light yellow in color  You should drink when you are thirsty and eat a healthy diet when you are    hungry.     Take naps to get the rest you need.   Take medications and/or drink alcohol only with permission of your obstetrician    or the babys pediatrician.  You can also call the [facility],   ([phone]), Monday-Friday, 8am-5pm Central time, to get the most   up-to-date evidence-based information on the use of medications during   pregnancy and breastfeeding.      The baby should be examined by a pediatrician at 3-5 days of age; unless ordered sooner by the pediatrician.  Once your milk comes in, the baby should be back to birth weight no later than 10-14 days of age.

## 2018-11-03 NOTE — ANESTHESIA POSTPROCEDURE EVALUATION
"Anesthesia Post Evaluation    Patient: Maria Del Rosario Vasquez    Procedure(s) Performed: * No procedures listed *    Final Anesthesia Type: epidural  Patient location during evaluation: labor & delivery  Patient participation: Yes- Able to Participate  Level of consciousness: awake and alert and oriented  Post-procedure vital signs: reviewed and stable  Pain management: adequate  Airway patency: patent  PONV status at discharge: No PONV  Anesthetic complications: no      Cardiovascular status: blood pressure returned to baseline  Respiratory status: unassisted  Hydration status: euvolemic  Follow-up not needed.        Visit Vitals  /74 (BP Location: Right arm, Patient Position: Sitting)   Pulse 99   Temp 36.5 °C (97.7 °F) (Oral)   Resp 18   Ht 5' 4" (1.626 m)   Wt 81.2 kg (179 lb)   LMP 01/19/2018   SpO2 97%   Breastfeeding? Unknown   BMI 30.73 kg/m²       Pain/Alejandrina Score: Pain Rating Prior to Med Admin: 6 (11/3/2018  9:33 AM)  Pain Rating Post Med Admin: 2 (11/3/2018  6:45 AM)        "

## 2018-11-03 NOTE — PROGRESS NOTES
[facility]  Obstetrics  Postpartum Progress Note    Patient Name: Maria Del Rosario Vasquez  MRN: [id number]  Admission Date: 2018  Hospital Length of Stay: 2 days  Attending Physician: Estuardo Melvin MD  Primary Care Provider: Primary Doctor No    Subjective:     Principal Problem:Normal labor    Hospital course: Admitted to L&D on 18 with active labor   on 18, girl, 2nd degree laceration with repair,  ml    Interval History: PPD#1, stable    She is doing well this morning. She is tolerating a regular diet without nausea or vomiting. She is voiding spontaneously. She is ambulating. She has passed flatus, and has not a BM. Vaginal bleeding is mild. She denies fever or chills. Abdominal pain is mild and controlled with oral medications. She is breastfeeding. She desires circumcision for her male baby: not applicable.  She is c/o lower back pain which is not relieved with ibuprofen but which feels somewhat better with ambulation.    Objective:     Vital Signs (Most Recent):  Temp: 97.7 °F (36.5 °C) (18 0745)  Pulse: 99 (18 0745)  Resp: 18 (1845)  BP: 121/74 (1845)  SpO2: 97 % (1845) Vital Signs (24h Range):  Temp:  [97.7 °F (36.5 °C)-99.1 °F (37.3 °C)] 97.7 °F (36.5 °C)  Pulse:  [] 99  Resp:  [16-20] 18  SpO2:  [96 %-100 %] 97 %  BP: (121-143)/(59-84) 121/74     Weight: 81.2 kg (179 lb)  Body mass index is 30.73 kg/m².      Intake/Output Summary (Last 24 hours) at 11/3/2018 0913  Last data filed at 11/3/2018 0100  Gross per 24 hour   Intake --   Output 900 ml   Net -900 ml       Significant Labs:  Lab Results   Component Value Date    GROUPTRH O POS 2018    HEPBSAG Negative 2016    STREPBCULT No Group B Streptococcus isolated 10/01/2018     Recent Labs   Lab 11/03/18  0417   HGB 10.7*   HCT 32.9*       CBC:   Recent Labs   Lab 18   WBC 16.71*   RBC 3.69*   HGB 10.7*   HCT 32.9*      MCV 89   MCH 29.0   MCHC 32.5      I have personallly reviewed all pertinent lab results from the last 24 hours.    Physical Exam:   Constitutional: She is oriented to person, place, and time. She appears well-developed and well-nourished.       Cardiovascular: Normal rate.     Pulmonary/Chest: Effort normal.        Abdominal: Soft.             Musculoskeletal: Normal range of motion.       Neurological: She is alert and oriented to person, place, and time.    Skin: Skin is warm and dry.    Psychiatric: She has a normal mood and affect.       Assessment/Plan:     23 y.o. female  for:    Postpartum hemorrhage    H&H this am 10.7/32.9  Denies dizziness or feeling faint  Encouraged to increase fluids and iron rich foods, resume prenatal vitamin with iron when returns home      (vaginal birth after )    PPD#1, stable, normal involution  Breastfeeding without difficulty  C/o lower back pain.  Reports nausea with narcotics.  To try alternating ibuprofen with tylenol, warm packs to lower back, gentle stretching         Disposition: As patient meets milestones, will plan to discharge tomorrow.    Danette Martinez CNM  Obstetrics  [facility]

## 2018-11-03 NOTE — L&D DELIVERY NOTE
[facility]  Vaginal Delivery   Operative Note    SUMMARY     Vaginal birth after cesearean  of live infant, was placed on mothers abdomen for skin to skin and bulb suctioning performed.  Infant delivered position OA over perineum.  Nuchal cord: No.    Spontaneous delivery of placenta and IV pitocin given noting good uterine tone following brief bleeding prior to placenta delivery. Placenta came promptly and bleeding slowed with good uterine tone.   2nd degree laceration noted and repaired in normal fashion with 3-0 Vicryl on CT. .  Patient tolerated delivery well. Sponge needle and lap counted correctly x2.    Indications: Normal labor  Pregnancy complicated by:   Patient Active Problem List   Diagnosis    Pregnancy, obstetrical care    Marginal placenta previa    Fetal renal anomaly    Normal labor     (vaginal birth after )    Postpartum hemorrhage     Admitting GA: 41w0d    Delivery Information for  Doreen Vasquez    Birth information:  YOB: 2019   Time of birth: 6:14 PM   Sex: female   Head Delivery Date/Time: 2018  6:14 PM   Delivery type: , Spontaneous   Gestational Age: 41w0d    Delivery Providers    Delivering clinician:  Zhanna Flores CNM   Provider Role    ANGEL Mcnulty RN Kayla Forjet, RN             Measurements    Weight:  3912 g  Length:  53.3 cm  Head circumference:  34.3 cm  Chest circumference:  33 cm         Apgars    Living status:  Living  Apgars:   1 min.:   5 min.:   10 min.:   15 min.:   20 min.:     Skin color:   1  1       Heart rate:   2  2       Reflex irritability:   2  2       Muscle tone:   2  2       Respiratory effort:   2  2       Total:   9  9       Apgars assigned by:  NEGAR MEHTA RN         Operative Delivery    Forceps attempted?:  No  Vacuum extractor attempted?:  No         Shoulder Dystocia    Shoulder dystocia present?:  No           Presentation    Presentation:  Vertex  Position:   Left Occiput Anterior           Interventions/Resuscitation    Method:  Bulb Suctioning, Tactile Stimulation       Cord    Vessels:  3 vessels  Complications:  None  Delayed Cord Clamping?:  Yes  Cord Clamped Date/Time:  2018  6:16 PM  Cord Blood Disposition:  Sent with Baby  Gases Sent?:  No  Stem Cell Collection (by MD):  No       Placenta    Placenta delivery date/time:  2018 182  Placenta removal:  Spontaneous  Placenta appearance:  Intact  Placenta disposition:  family           Labor Events:       labor: No     Labor Onset Date/Time:         Dilation Complete Date/Time: 2018 17:25     Start Pushing Date/Time: 2018 17:40     Rupture Date/Time:              Rupture type:           Fluid Amount:        Fluid Color:        Fluid Odor:        Membrane Status (PeriCalm): ARM (Artificial Rupture)      Rupture Date/Time (PeriCalm): 2018 05:15:00      Fluid Amount (PeriCalm):        Fluid Color (PeriCalm): Clear       steroids: None     Antibiotics given for GBS: No     Induction:       Indications for induction:        Augmentation: amniotomy;oxytocin     Indications for augmentation:       Labor complications: None     Additional complications:          Cervical ripening:                     Delivery:      Episiotomy:       Indication for Episiotomy:       Perineal Lacerations: 2nd Repaired:  Yes   Periurethral Laceration: left Repaired: No   Labial Laceration:   Repaired:     Sulcus Laceration:   Repaired:     Vaginal Laceration: No Repaired:     Cervical Laceration: No Repaired:     Repair suture:       Repair # of packets: 2     Vaginal delivery QBL (mL): 700      QBL (mL): 0     Combined Blood Loss (mL): 700     Vaginal Sweep Performed: Yes     Surgicount Correct:         Other providers:       Anesthesia    Method:  Epidural          Details (if applicable):  Trial of Labor      Categorization:      Priority:     Indications for  :     Incision Type:       Additional  information:  Forceps:    Vacuum:    Breech:    Observed anomalies    Other (Comments):

## 2018-11-03 NOTE — LACTATION NOTE
LC visit to room. Mother states that baby has been nursing well. Mother states the last baby was lip and tongue tied and that baby had revision done by Dr. Jaimes. This baby seems to be nursing well.LC left phone number on mother's white board for mother to call for asst as needed.Told mother what time LC leaves the floor. Mother also told that LC can see when she calls [employer] phone and if LC does not answer, she is busy but will come as soon as possible.

## 2018-11-03 NOTE — ASSESSMENT & PLAN NOTE
PPD#1, stable, normal involution  Breastfeeding without difficulty  C/o lower back pain.  Reports nausea with narcotics.  To try alternating ibuprofen with tylenol, warm packs to lower back, gentle stretching

## 2018-11-04 VITALS
DIASTOLIC BLOOD PRESSURE: 77 MMHG | SYSTOLIC BLOOD PRESSURE: 120 MMHG | TEMPERATURE: 98 F | WEIGHT: 179 LBS | RESPIRATION RATE: 18 BRPM | OXYGEN SATURATION: 98 % | HEIGHT: 64 IN | BODY MASS INDEX: 30.56 KG/M2 | HEART RATE: 85 BPM

## 2018-11-04 PROCEDURE — 25000003 PHARM REV CODE 250: Performed by: ADVANCED PRACTICE MIDWIFE

## 2018-11-04 RX ADMIN — IBUPROFEN 600 MG: 600 TABLET ORAL at 12:11

## 2018-11-04 RX ADMIN — IBUPROFEN 600 MG: 600 TABLET ORAL at 05:11

## 2018-11-04 NOTE — ASSESSMENT & PLAN NOTE
Denies dizziness or feeling faint  Encouraged to increase fluids and iron rich foods, resume prenatal vitamin with iron when returns home

## 2018-11-04 NOTE — PLAN OF CARE
Problem: Patient Care Overview  Goal: Plan of Care Review  Outcome: Outcome(s) achieved Date Met: 11/04/18  VSS. Ambulating and voiding without difficulty. Tolerating a regular diet. Pain controlled with oral pain medication. Mother baby care guide reviewed with patient on previous shift; additional questions answered. Ok to d/c home per CNM order. Discharge instructions reviewed with patient and spouse; patient verbalizes understanding. ID bands verified. Paperwork signed.

## 2018-11-04 NOTE — DISCHARGE SUMMARY
[facility]  Obstetrics  Discharge Summary      Patient Name: Maria Del Rosario Vasquez  MRN: [id number]  Admission Date: 2018  Hospital Length of Stay: 3 days  Discharge Date and Time:  2018 11:35 AM  Attending Physician: Estuardo Melvin MD   Discharging Provider: Zhanna Flores CNM  Primary Care Provider: Primary Doctor No    HPI: 22yo  at 40w6d called office this am with complaints of regular contractions q4-6 minutes, breathing through them on the phone.  States she started regina yesterday am around 0730 and things have continued to progress over past 24hrs.  Patient has significant hx of primary c/s due to arrest at 4cm, TOLAC desired.  Denies vaginal bleeding, small pink show, denies leaking fluid, +fetal movement.  Patient presents to the ABC breathing through contractions q 4-5min, tearful.      * No surgery found *     Hospital Course:   Admitted to L&D on 18 with active labor   on 18, girl, 2nd degree laceration with repair,  ml  2018: Pt healing well. Normal PP course - d/c home.      Doing well, ambulating, voiding, and tolerating regular diet  Lochia: steadily decreasing  Pain: well controlled  Breasts/nipples: breast feeding well without difficulty  Depression/anxiety: denies   Support at home: good  Contraception: considering GOLDMAN; understands that progesterone only options are appropriate with breastfeeding  : Rox is doing well, will f/u with pediatrician     Gen: A&O x 4, NAD  CV: normal HR  Lungs: normal resp effort  Breasts: bilaterally soft, non-tender, nipples intact   Abdomen: soft, non-tender, uterus firm at U - 2 fb  Perineum: approximated, no edema   Lochia: minimal rubra  Ext: bilaterally no pedal edema without signs of DVT      Final Active Diagnoses:    Diagnosis Date Noted POA     (vaginal birth after ) [O34.219] 2018 No    Postpartum hemorrhage [O72.1] 2018 No      Problems Resolved During this  Admission:    Diagnosis Date Noted Date Resolved POA    PRINCIPAL PROBLEM:  Normal labor [O80, Z37.9] 2018 Not Applicable    Hx C/S, desires TOLAC, cleared 18 [Z98.891] 01/10/2017 2018 Not Applicable        Labs:   CBC   Recent Labs   Lab 18  0417   WBC 16.71*   HGB 10.7*   HCT 32.9*          Feeding Method: breast    Immunizations     Date Immunization Status Dose Route/Site Given by    18 MMR Incomplete 0.5 mL Subcutaneous/Left deltoid     18 Tdap Incomplete 0.5 mL Intramuscular/Left deltoid           Delivery:    Episiotomy:     Lacerations: 2nd   Repair suture:     Repair # of packets: 2   Blood loss (ml): 700     Birth information:  YOB: 2019   Time of birth: 6:14 PM   Sex: female   Delivery type: , Spontaneous   Gestational Age: 41w0d    Delivery Clinician:      Other providers:       Additional  information:  Forceps:    Vacuum:    Breech:    Observed anomalies      Living?:           APGARS  One minute Five minutes Ten minutes   Skin color:         Heart rate:         Grimace:         Muscle tone:         Breathing:         Totals: 9  9        Placenta: Delivered:       appearance    Pending Diagnostic Studies:     None          Discharged Condition: good    Disposition: Home or Self Care    Follow Up:    Patient Instructions:      Diet Adult Regular     Notify your health care provider if you experience any of the following:  temperature >100.4     Notify your health care provider if you experience any of the following:  persistent nausea and vomiting or diarrhea     Notify your health care provider if you experience any of the following:  severe uncontrolled pain     Notify your health care provider if you experience any of the following:  redness, tenderness, or signs of infection (pain, swelling, redness, odor or green/yellow discharge around incision site)     Notify your health care provider if you experience any of the  following:  difficulty breathing or increased cough     Notify your health care provider if you experience any of the following:  severe persistent headache     Notify your health care provider if you experience any of the following:  worsening rash     Notify your health care provider if you experience any of the following:  persistent dizziness, light-headedness, or visual disturbances     Notify your health care provider if you experience any of the following:  increased confusion or weakness     Notify your health care provider if you experience any of the following:     Activity as tolerated     Medications:  Current Discharge Medication List      CONTINUE these medications which have NOT CHANGED    Details   Lactobacillus rhamnosus GG (CULTURELLE) 10 billion cell capsule Take 1 capsule by mouth once daily.      PNV CMB#21/IRON/FOLIC ACID (PRENATAL COMPLETE ORAL) Take by mouth.         STOP taking these medications       clotrimazole (LOTRIMIN) 1 % cream Comments:   Reason for Stopping:         progesterone (PROMETRIUM) 200 MG capsule Comments:   Reason for Stopping:         terconazole (TERAZOL 3) 0.8 % vaginal cream Comments:   Reason for Stopping:               Zhanna Flores CNM  Obstetrics  [facility]

## 2018-11-04 NOTE — LACTATION NOTE
LC did discharge lactation teaching and reviewed the Mother's Breastfeeding Guide. LC answered all questions. Mother has  phone number  for questions after DC.   Mother may refer to the After Visit Summary for lactation instructions. Call for latch on check at next feeding.  LC left phone number on mother's white board for mother to call for asst as needed.Told mother what time LC leaves the floor. Mother also told that LC can see when she calls [employer] phone and if LC does not answer, she is busy but will come as soon as possible.

## 2018-11-04 NOTE — PROGRESS NOTES
S) Patient resting comfortably on L side with peanut ball. Denies complaints or needs.   O) Temp:  [97.7 °F (36.5 °C)-97.8 °F (36.6 °C)]   Pulse:  [72-85]   Resp:  [18]   BP: (114-120)/(77)   SpO2:  [98 %]   VSS, afebrile.  SVE 5-6/80/-2 (minimal change since last check), continues leaking clear fluid.  , +accels, no decels, moderate variability.  Contractions q 2-6 mild- mod-strong to palpation.  A)  TOLAC candidate at term   Labor early active with suspected inadequate contractions   Category 1 fhr/ Reassuring fetal status  P) Pitocin recommended, discussed with patient and her  and order given to RN, continue fetal surveillance, antiicpate / successful .

## 2018-12-10 ENCOUNTER — POSTPARTUM VISIT (OUTPATIENT)
Dept: OBSTETRICS AND GYNECOLOGY | Facility: CLINIC | Age: 23
End: 2018-12-10
Payer: COMMERCIAL

## 2018-12-10 VITALS
HEIGHT: 64 IN | DIASTOLIC BLOOD PRESSURE: 70 MMHG | BODY MASS INDEX: 26.85 KG/M2 | WEIGHT: 157.31 LBS | SYSTOLIC BLOOD PRESSURE: 108 MMHG

## 2018-12-10 DIAGNOSIS — Z01.419 ENCOUNTER FOR GYNECOLOGICAL EXAMINATION WITHOUT ABNORMAL FINDING: ICD-10-CM

## 2018-12-10 DIAGNOSIS — Z00.00 PREVENTATIVE HEALTH CARE: Primary | ICD-10-CM

## 2018-12-10 PROCEDURE — 88175 CYTOPATH C/V AUTO FLUID REDO: CPT

## 2018-12-10 PROCEDURE — 0503F POSTPARTUM CARE VISIT: CPT | Mod: S$GLB,,, | Performed by: ADVANCED PRACTICE MIDWIFE

## 2018-12-10 PROCEDURE — 99999 PR PBB SHADOW E&M-EST. PATIENT-LVL III: CPT | Mod: PBBFAC,,, | Performed by: ADVANCED PRACTICE MIDWIFE

## 2018-12-10 RX ORDER — CEFDINIR 300 MG/1
CAPSULE ORAL
COMMUNITY
Start: 2018-12-06

## 2018-12-10 RX ORDER — FERROUS SULFATE, DRIED 160(50) MG
1 TABLET, EXTENDED RELEASE ORAL 2 TIMES DAILY WITH MEALS
COMMUNITY

## 2018-12-11 NOTE — PROGRESS NOTES
Maria Del Rosario Vasquez is a 23 y.o.  is here for a postpartum visit.  Estimated Date of Delivery: 10/26/18     DELIVERY HISTORY   delivery on 18 at term gestation, LFI infant, weight 3912 g, second degree laceration with repair. Breastfeeding infant. Brings family with her to visit - Tomás Clayton, baby Rox.     C/C: Postpartum exam.   No problems today    Delivery note:  Vaginal birth after cesearean  of live infant, was placed on mothers abdomen for skin to skin and bulb suctioning performed.  Infant delivered position OA over perineum.  Nuchal cord: No.     Spontaneous delivery of placenta and IV pitocin given noting good uterine tone following brief bleeding prior to placenta delivery. Placenta came promptly and bleeding slowed with good uterine tone.   2nd degree laceration noted and repaired in normal fashion with 3-0 Vicryl on CT. .  Patient tolerated delivery well. Sponge needle and lap counted correctly x2.    Pregnancy was c/b by:  Patient Active Problem List    Diagnosis Date Noted    Postpartum care and examination 12/10/2018     (vaginal birth after ) 2018    Postpartum hemorrhage 2018     HPI:  Doing well; ambulating and tolerating regular diet  Lochia: none/stopped   Vaginal pain: slight discomfort at site of laceration  Abdominal pain: denies   Breasts/nipples:  breastfeeding well without difficulty and infant is gaining appropriately per their pediatrician  Bowel/bladder function: normal/normal  Depression/anxiety: denies ; EPDS score: 0  Support at home: adequate  Contraception: considering NFP/GOLDMAN; declines contraception    Pap hx:  negative    Past Medical History:   Diagnosis Date    Anemia     past     Past Surgical History:   Procedure Laterality Date     SECTION  2017    DELIVERY- SECTION N/A 2017    [facility]    TONSILLECTOMY      WISDOM TOOTH EXTRACTION       Review of patient's  "allergies indicates:  No Known Allergies    Current Outpatient Medications:     calcium-vitamin D3 (OS-ANALI 500 + D3) 500 mg(1,250mg) -200 unit per tablet, Take 1 tablet by mouth 2 (two) times daily with meals., Disp: , Rfl:     cefdinir (OMNICEF) 300 MG capsule, , Disp: , Rfl:     Lactobacillus rhamnosus GG (CULTURELLE) 10 billion cell capsule, Take 1 capsule by mouth once daily., Disp: , Rfl:     PNV CMB#21/IRON/FOLIC ACID (PRENATAL COMPLETE ORAL), Take by mouth., Disp: , Rfl:     PE:  OB History    Para Term  AB Living   3 2 2 0 1 2   SAB TAB Ectopic Multiple Live Births   1 0 0 0 2      # Outcome Date GA Lbr Yanick/2nd Weight Sex Delivery Anes PTL Lv   3 Term 18 41w0d / 00:49 3.912 kg (8 lb 10 oz) F  EPI N BLESSING   2 Term 17 41w0d  3.684 kg (8 lb 2 oz) M CS-LTranv EPI N BLESSING      Complications: Failure to Progress in First Stage   1 SAB 16 6w0d             Birth Comments: passed naturally         Vitals:    12/10/18 1310   BP: 108/70     /70   Ht 5' 4" (1.626 m)   Wt 71.3 kg (157 lb 4.8 oz)   LMP 2018   Breastfeeding? Yes   BMI 27.00 kg/m²   Gen: A&O x 4, NAD  Neck: non-tender, not enlarged, no masses or nodules  CV: normal HR  Lungs: normal resp effort  Breasts: lactating , bilaterally: no masses, non-tender, nipples intact  Abdomen: soft, nontender, and nondistended, diastasis recti 0.5 cm, fundus np  Vulva/Vagina: healed well, approximated, non-tender. Laceration site intact with suture still present  Speculum: cervix appears closed, no abn lesions, no abn discharge  Bimanual: uterus involuted, non-tender, neg CMT, adnexa free bilaterally  Pap Smear today? yes    ASSESSMENT/PLAN:  Postpartum exam s/p  delivery  -- Counseling regarding resuming normal activities of exercise and work.  -- Postpartum precautions reviewed  -- Reviewed pap guidelines. Pap today  -- Continue annual exams; return then or sooner if any symptoms of pp depression or any other " problem.    Laceration site healing  -- Reviewed PE findings and provided reassurance, laceration site is intact and healing very well with suture intact. Reviewed postpartum comfort measures for perineal laceration healing. Asked that she let me know if residual remains after ~ 8 weeks or so postpartum and we can easily remove any residual suture in the office.    Birth control counseling  --- Declines; uses NFP/GOLDMAN. Criteria reviewed    Breast Feeding  -- Continue PNV while breastfeeding.      UPDATED MED LIST:  Current Outpatient Medications   Medication Sig Dispense Refill    calcium-vitamin D3 (OS-ANALI 500 + D3) 500 mg(1,250mg) -200 unit per tablet Take 1 tablet by mouth 2 (two) times daily with meals.      cefdinir (OMNICEF) 300 MG capsule       Lactobacillus rhamnosus GG (CULTURELLE) 10 billion cell capsule Take 1 capsule by mouth once daily.      PNV CMB#21/IRON/FOLIC ACID (PRENATAL COMPLETE ORAL) Take by mouth.       No current facility-administered medications for this visit.        Celina Rai CNM/ANNIE  12/10/11890:39 PM

## 2022-04-28 ENCOUNTER — CLINICAL SUPPORT (OUTPATIENT)
Dept: OTHER | Facility: CLINIC | Age: 27
End: 2022-04-28
Payer: COMMERCIAL

## 2022-04-28 DIAGNOSIS — Z00.8 ENCOUNTER FOR OTHER GENERAL EXAMINATION: ICD-10-CM

## 2022-04-29 VITALS
SYSTOLIC BLOOD PRESSURE: 110 MMHG | WEIGHT: 140 LBS | DIASTOLIC BLOOD PRESSURE: 78 MMHG | BODY MASS INDEX: 23.9 KG/M2 | HEIGHT: 64 IN

## 2022-04-29 LAB
HDLC SERPL-MCNC: 62 MG/DL
POC CHOLESTEROL, TOTAL: 129 MG/DL
POC GLUCOSE, FASTING: 92 MG/DL (ref 60–110)
TRIGL SERPL-MCNC: 44 MG/DL

## 2023-04-27 ENCOUNTER — CLINICAL SUPPORT (OUTPATIENT)
Dept: OTHER | Facility: CLINIC | Age: 28
End: 2023-04-27
Payer: COMMERCIAL

## 2023-04-27 DIAGNOSIS — Z00.8 ENCOUNTER FOR OTHER GENERAL EXAMINATION: ICD-10-CM

## 2023-04-28 VITALS
HEIGHT: 63 IN | WEIGHT: 153 LBS | BODY MASS INDEX: 27.11 KG/M2 | DIASTOLIC BLOOD PRESSURE: 76 MMHG | SYSTOLIC BLOOD PRESSURE: 114 MMHG

## 2023-04-28 LAB
HDLC SERPL-MCNC: 71 MG/DL
POC CHOLESTEROL, TOTAL: 161 MG/DL
POC GLUCOSE, FASTING: 85 MG/DL (ref 60–110)
TRIGL SERPL-MCNC: 44 MG/DL